# Patient Record
Sex: MALE | Race: BLACK OR AFRICAN AMERICAN | Employment: FULL TIME | URBAN - METROPOLITAN AREA
[De-identification: names, ages, dates, MRNs, and addresses within clinical notes are randomized per-mention and may not be internally consistent; named-entity substitution may affect disease eponyms.]

---

## 2022-01-19 ENCOUNTER — HOSPITAL ENCOUNTER (OUTPATIENT)
Dept: OCCUPATIONAL MEDICINE | Age: 62
Discharge: HOME | End: 2022-01-19
Attending: PREVENTIVE MEDICINE

## 2022-01-19 DIAGNOSIS — R76.11 POSITIVE PPD: ICD-10-CM

## 2022-07-15 ENCOUNTER — TRANSCRIBE ORDER (OUTPATIENT)
Dept: INTERVENTIONAL RADIOLOGY/VASCULAR | Age: 62
End: 2022-07-15

## 2022-07-15 DIAGNOSIS — M48.061 SPINAL STENOSIS AT L4-L5 LEVEL: Primary | ICD-10-CM

## 2022-07-15 NOTE — PROGRESS NOTES
Spoke with Mr. Zhen Jose to schedule for myelogram. Pt scheduled based on patient preference and appointment availability. Pt to arrive on Thursday 7/21/2022 at 1000 for 1100 appointment. NPO status not required. Pt encouraged to eat a light breakfast and drink baseline caffeine on the morning of the procedure. Pt instructed to have a  to and from the appointment. Anti-coagulant use: Aspirin, will hold morning of the exam. Plavix, will hold starting Sunday 7/17/22 for a total of 5 days. Patient was provided with the nursing office phone number and encouraged to call with any questions. All myelogram specific questions and procedural information was reviewed with Mr. Zhen Jose, who then requested this RN speak with his life partner Merlin Gardner as well. Mr. Zhen Jose gave full permission to speak with Ms. Frances as she is his primary care provider. All information was reviewed with Ms. Frances and all questions were answered. Ms. Caitlyn Mcguire raised concern that patient may need pain medication to lay flat in recovery for 4 hours post myelogram. She will follow up with Dr. Radha Chao to request prescription.   James Cochran, RN

## 2022-07-20 NOTE — PROGRESS NOTES
Spoke with  SA[facility] to verify 5 day hold of Plavix, patient reports he has not taken this medication since Sunday 7/17. Patient also instructed to not take his daily Aspirin in the morning.

## 2022-07-21 ENCOUNTER — HOSPITAL ENCOUNTER (OUTPATIENT)
Dept: GENERAL RADIOLOGY | Age: 62
Discharge: HOME | End: 2022-07-21
Attending: ORTHOPAEDIC SURGERY | Admitting: RADIOLOGY
Payer: MEDICARE

## 2022-07-21 ENCOUNTER — APPOINTMENT (OUTPATIENT)
Dept: CT IMAGING | Age: 62
End: 2022-07-21
Attending: ORTHOPAEDIC SURGERY
Payer: MEDICARE

## 2022-07-21 VITALS
WEIGHT: 236 LBS | TEMPERATURE: 97.9 F | DIASTOLIC BLOOD PRESSURE: 40 MMHG | HEIGHT: 66 IN | RESPIRATION RATE: 20 BRPM | HEART RATE: 72 BPM | SYSTOLIC BLOOD PRESSURE: 122 MMHG | BODY MASS INDEX: 37.93 KG/M2 | OXYGEN SATURATION: 99 %

## 2022-07-21 DIAGNOSIS — M48.061 SPINAL STENOSIS AT L4-L5 LEVEL: ICD-10-CM

## 2022-07-21 PROCEDURE — 72132 CT LUMBAR SPINE W/DYE: CPT

## 2022-07-21 PROCEDURE — 74011250637 HC RX REV CODE- 250/637: Performed by: PHYSICIAN ASSISTANT

## 2022-07-21 PROCEDURE — 62304 MYELOGRAPHY LUMBAR INJECTION: CPT

## 2022-07-21 PROCEDURE — 74011000636 HC RX REV CODE- 636: Performed by: ORTHOPAEDIC SURGERY

## 2022-07-21 RX ORDER — DIAZEPAM 5 MG/1
2.5 TABLET ORAL
COMMUNITY

## 2022-07-21 RX ORDER — LIDOCAINE HYDROCHLORIDE 10 MG/ML
1-10 INJECTION, SOLUTION EPIDURAL; INFILTRATION; INTRACAUDAL; PERINEURAL
Status: COMPLETED | OUTPATIENT
Start: 2022-07-21 | End: 2022-07-21

## 2022-07-21 RX ORDER — OXYCODONE AND ACETAMINOPHEN 5; 325 MG/1; MG/1
1-2 TABLET ORAL
Status: DISCONTINUED | OUTPATIENT
Start: 2022-07-21 | End: 2022-07-21 | Stop reason: HOSPADM

## 2022-07-21 RX ORDER — MIDODRINE HYDROCHLORIDE 10 MG/1
10 TABLET ORAL
COMMUNITY

## 2022-07-21 RX ORDER — ACETAMINOPHEN 325 MG/1
650 TABLET ORAL
Status: DISCONTINUED | OUTPATIENT
Start: 2022-07-21 | End: 2022-07-21 | Stop reason: HOSPADM

## 2022-07-21 RX ADMIN — IOPAMIDOL 14 ML: 408 INJECTION, SOLUTION INTRATHECAL at 12:05

## 2022-07-21 RX ADMIN — OXYCODONE AND ACETAMINOPHEN 2 TABLET: 5; 325 TABLET ORAL at 12:31

## 2022-07-21 RX ADMIN — LIDOCAINE HYDROCHLORIDE 4 ML: 10 INJECTION, SOLUTION EPIDURAL; INFILTRATION; INTRACAUDAL; PERINEURAL at 12:06

## 2022-07-21 NOTE — DISCHARGE INSTRUCTIONS
Myelogram: About This Test  What is it? A myelogram uses X-rays and a special dye to make pictures of bones and nerves of the spine (spinal canal). The spinal canal holds the spinal cord, the spinal nerve roots, and the fluid-filled space between the bones in your spine. Why is this test done? A myelogram is done to check for: The cause of arm or leg numbness, weakness, or pain. Narrowing of the spinal canal (spinal stenosis). A tumor or infection causing problems with the spinal cord or nerve roots. A spinal disc that has ruptured (herniated disc). Inflammation of the membrane that covers the brain and spinal cord. Problems with the blood vessels to the spine. This test may help find the cause of pain that can't be found by other tests, such as an MRI or a CT scan. How do you prepare for the test?  Your doctor will tell you if you need to change how much you eat and drink before the myelogram. You may be asked to increase the amount of water you drink before the test. Follow the instructions your doctor gives you about eating and drinking. If you take aspirin or some other blood thinner, ask your doctor if you should stop taking it before your test. Make sure that you understand exactly what your doctor wants you to do. These medicines increase the risk of bleeding. Be sure you have someone to take you home. Anesthesia and pain medicine will make it unsafe for you to drive or get home on your own. How is the test done? The dye is put into your spinal canal with a thin needle. This is called a lumbar puncture. The dye moves through the space so the nerve roots and spinal cord can be seen more clearly. After the dye is put in, you will lie still while the X-ray pictures are taken. How does it feel? You will feel a quick sting from a small needle that has medicine to numb the skin on your back.  You will also feel some pressure as the long, thin spinal needle is put into your spinal canal. You may feel a quick, sharp pain down your buttock or leg when the needle is moved in your spine. You may find it hard to lie on your stomach or side during this test.  The dye may make you feel warm and flushed and have a metallic taste in your mouth. Some people feel sick to their stomach or have a headache. Tell your doctor how you are feeling. How long does the test take? A myelogram usually takes 30 minutes to 1 hour. What happens after the test?  You will probably be able to go home 30 minutes to 2 hours after the test.  You may need to lie in bed with your head raised for 4 to 24 hours after the test. To prevent seizures, which are a rare side effect, do not bend over or lie down with your head lower than your body. Keeping your head higher than your body after a myelogram also may help prevent or reduce other side effects, such as headache, nausea, or vomiting. Avoid strenuous activity, such as running or heavy lifting, for at least 1 day after your myelogram.  Drink plenty of water after the myelogram. Your doctor will give you instructions on taking your regular medicines. When should you call for help? Call 911 anytime you think you may need emergency care. For example, call if:  You have a seizure. Call your doctor now or seek immediate medical care if:  You have any increase in pain, weakness, or numbness in your legs. You have a severe headache or stiff neck, or your eyes become very sensitive to light. You have a headache that lasts longer than 24 hours. You have problems urinating or having a bowel movement. You have a fever. Follow-up care is a key part of your treatment and safety. Be sure to make and go to all appointments, and call your doctor if you are having problems. It's also a good idea to keep a list of the medicines you take. Ask your doctor when you can expect to have your test results. Where can you learn more?   Go to http://www.gray.com/  Enter K481 in the search box to learn more about \"Myelogram: About This Test.\"  Current as of: June 17, 2021               Content Version: 13.2  © 2006-2022 [employer]. Care instructions adapted under license by [employer] (which disclaims liability or warranty for this information). If you have questions about a medical condition or this instruction, always ask your healthcare professional. [address] any warranty or liability for your use of this information. DISCHARGE SUMMARY from Nurse    PATIENT INSTRUCTIONS:    After general anesthesia or intravenous sedation, for 24 hours or while taking prescription Narcotics:  Limit your activities  Do not drive and operate hazardous machinery  Do not make important personal or business decisions  Do  not drink alcoholic beverages  If you have not urinated within 8 hours after discharge, please contact your surgeon on call. Report the following to your surgeon:  Excessive pain, swelling, redness or odor of or around the surgical area  Temperature over 100.5  Nausea and vomiting lasting longer than 4 hours or if unable to take medications  Any signs of decreased circulation or nerve impairment to extremity: change in color, persistent  numbness, tingling, coldness or increase pain  Any questions    What to do at Home:  Recommended activity: Activity as tolerated,     *  Please give a list of your current medications to your Primary Care Provider. *  Please update this list whenever your medications are discontinued, doses are      changed, or new medications (including over-the-counter products) are added. *  Please carry medication information at all times in case of emergency situations.     These are general instructions for a healthy lifestyle:    No smoking/ No tobacco products/ Avoid exposure to second hand smoke  Surgeon General's Warning:  Quitting smoking now greatly reduces serious risk to your health. Obesity, smoking, and sedentary lifestyle greatly increases your risk for illness    A healthy diet, regular physical exercise & weight monitoring are important for maintaining a healthy lifestyle    You may be retaining fluid if you have a history of heart failure or if you experience any of the following symptoms:  Weight gain of 3 pounds or more overnight or 5 pounds in a week, increased swelling in our hands or feet or shortness of breath while lying flat in bed. Please call your doctor as soon as you notice any of these symptoms; do not wait until your next office visit. The discharge information has been reviewed with the patient and spouse. The patient and spouse verbalized understanding. Discharge medications reviewed with the patient and spouse and appropriate educational materials and side effects teaching were provided.   ___________________________________________________________________________________________________________________________________

## 2022-07-21 NOTE — PROCEDURES
Interventional Radiology Brief Procedure Note    Performed By: Nicolle Richey PA-C    Attending Radiologist: Elan Ruvalcaba MD    Pre-operative Diagnosis:  Low back pain & bilateral leg radiculopathy x 2 years    Post-operative Diagnosis: Same as pre-op diagnosis    Procedure(s) Performed:  Lumbar Myelogram    Anesthesia:  Local Anesthesia    Findings:  The patient's low back was prepped in the usual sterile manner. 1% Lidocaine was used for local anesthesia. A 20 G x 6 inch spinal needle was advanced into the spinal subarachnoid space via a sublaminar oblique approach at L3. There was immediate return of clear CSF. 15 cc of Isovue 200M was instilled into dural sac. Images and subsequent CT scan to follow. Complications: None immediate    Estimated Blood Loss:  Minimal    Tubes and Drains: None    Specimens: None    Condition: Good    Disposition:  Observation x 4 hours, then D/C home.     Nicolle Richey PA-C  [facility]  Vascular & Interventional Radiology  7/21/2022

## 2022-07-21 NOTE — PROGRESS NOTES
Pt is all prepped and ready for procedure    1130 Pt picked up for procedure    1230 Pt back to care unit. Procedure tolerated with pain ongoing as per patient. Band-aid to lower back dry and intact. C/o lower back pain 10/10, two percocet adm ASAP. Pt took diazepam 12.5 mg own med for anxiety    1300 Pt resting with eyes closed and voiced no further complaints. 1400 Discharge instructions reviewed with pt and spouse and they verbalized all understandings.      [phone] Pt escorted to car via w/c , left with spouse in stable condition

## 2022-08-08 ENCOUNTER — TRANSCRIBE ORDER (OUTPATIENT)
Dept: SCHEDULING | Age: 62
End: 2022-08-08

## 2022-08-08 DIAGNOSIS — C90.00 MYELOMA (HCC): Primary | ICD-10-CM

## 2023-03-06 ENCOUNTER — TRANSCRIBE ORDERS (OUTPATIENT)
Facility: HOSPITAL | Age: 63
End: 2023-03-06

## 2023-03-06 DIAGNOSIS — M43.16 SPONDYLOLISTHESIS OF LUMBAR REGION: Primary | ICD-10-CM

## 2023-04-07 ENCOUNTER — HOSPITAL ENCOUNTER (OUTPATIENT)
Facility: HOSPITAL | Age: 63
Discharge: HOME | End: 2023-04-07
Attending: ORTHOPAEDIC SURGERY | Admitting: ORTHOPAEDIC SURGERY
Payer: MEDICARE

## 2023-04-07 ENCOUNTER — APPOINTMENT (OUTPATIENT)
Facility: HOSPITAL | Age: 63
End: 2023-04-07
Payer: MEDICARE

## 2023-04-07 ENCOUNTER — APPOINTMENT (OUTPATIENT)
Facility: HOSPITAL | Age: 63
End: 2023-04-07
Attending: ORTHOPAEDIC SURGERY
Payer: MEDICARE

## 2023-04-07 VITALS
HEART RATE: 55 BPM | HEIGHT: 65 IN | WEIGHT: 179.8 LBS | TEMPERATURE: 98 F | DIASTOLIC BLOOD PRESSURE: 66 MMHG | RESPIRATION RATE: 16 BRPM | BODY MASS INDEX: 29.96 KG/M2 | SYSTOLIC BLOOD PRESSURE: 152 MMHG | OXYGEN SATURATION: 100 %

## 2023-04-07 DIAGNOSIS — M47.816 LUMBAR SPONDYLOSIS: ICD-10-CM

## 2023-04-07 DIAGNOSIS — M43.16 SPONDYLOLISTHESIS, LUMBAR REGION: ICD-10-CM

## 2023-04-07 DIAGNOSIS — M43.16 SPONDYLOLISTHESIS OF LUMBAR REGION: ICD-10-CM

## 2023-04-07 LAB
EKG ATRIAL RATE: 62 BPM
EKG DIAGNOSIS: NORMAL
EKG P AXIS: 88 DEGREES
EKG P-R INTERVAL: 178 MS
EKG Q-T INTERVAL: 486 MS
EKG QRS DURATION: 184 MS
EKG QTC CALCULATION (BAZETT): 493 MS
EKG R AXIS: 270 DEGREES
EKG T AXIS: 82 DEGREES
EKG VENTRICULAR RATE: 62 BPM

## 2023-04-07 PROCEDURE — 2500000003 HC RX 250 WO HCPCS: Performed by: ORTHOPAEDIC SURGERY

## 2023-04-07 PROCEDURE — 93005 ELECTROCARDIOGRAM TRACING: CPT

## 2023-04-07 PROCEDURE — 62304 MYELOGRAPHY LUMBAR INJECTION: CPT

## 2023-04-07 PROCEDURE — 72132 CT LUMBAR SPINE W/DYE: CPT

## 2023-04-07 PROCEDURE — 6360000004 HC RX CONTRAST MEDICATION: Performed by: ORTHOPAEDIC SURGERY

## 2023-04-07 RX ORDER — LIDOCAINE HYDROCHLORIDE 10 MG/ML
23 INJECTION, SOLUTION EPIDURAL; INFILTRATION; INTRACAUDAL; PERINEURAL ONCE
Status: COMPLETED | OUTPATIENT
Start: 2023-04-07 | End: 2023-04-07

## 2023-04-07 RX ORDER — LIDOCAINE HYDROCHLORIDE 10 MG/ML
20 INJECTION, SOLUTION EPIDURAL; INFILTRATION; INTRACAUDAL; PERINEURAL ONCE
Status: DISCONTINUED | OUTPATIENT
Start: 2023-04-07 | End: 2023-04-07

## 2023-04-07 RX ADMIN — IOPAMIDOL 12 ML: 408 INJECTION, SOLUTION INTRATHECAL at 13:02

## 2023-04-07 RX ADMIN — LIDOCAINE HYDROCHLORIDE 23 ML: 10 INJECTION, SOLUTION EPIDURAL; INFILTRATION; INTRACAUDAL; PERINEURAL at 13:07

## 2023-04-07 ASSESSMENT — PAIN - FUNCTIONAL ASSESSMENT
PAIN_FUNCTIONAL_ASSESSMENT: PREVENTS OR INTERFERES SOME ACTIVE ACTIVITIES AND ADLS
PAIN_FUNCTIONAL_ASSESSMENT: NONE - DENIES PAIN

## 2023-04-07 NOTE — PROGRESS NOTES
1140  pt to ultrasound via stretcher  1300  pt returned from ultrasound,  pt alert and oriented,  band aid intact to mid lower back,  full purposeful rom in both lower extremities but increases back pain per pt  Lunch given  Resting and watching tv  1415 pt dressed self .  Discharge inst given and reivewed with pt,  pt verbally inderstanding,  arm bands removed and shredded,  pt discharged via wheelchair to car in care of friend,  pt states pain 7/10 at time of discharge

## 2023-04-10 PROBLEM — M51.26 HNP (HERNIATED NUCLEUS PULPOSUS), LUMBAR: Status: ACTIVE | Noted: 2023-04-10

## 2023-04-10 PROBLEM — M51.36 DDD (DEGENERATIVE DISC DISEASE), LUMBAR: Status: ACTIVE | Noted: 2023-04-10

## 2023-04-10 PROBLEM — M48.062 SPINAL STENOSIS, LUMBAR REGION WITH NEUROGENIC CLAUDICATION: Status: ACTIVE | Noted: 2023-04-10

## 2023-04-10 PROBLEM — M51.369 DDD (DEGENERATIVE DISC DISEASE), LUMBAR: Status: ACTIVE | Noted: 2023-04-10

## 2023-04-14 PROBLEM — Z95.0 CARDIAC PACEMAKER: Status: ACTIVE | Noted: 2023-04-14

## 2023-04-14 PROBLEM — Z22.322 MRSA NASAL COLONIZATION: Status: ACTIVE | Noted: 2023-04-14

## 2023-04-14 PROBLEM — Z94.0 RENAL TRANSPLANT, STATUS POST: Status: ACTIVE | Noted: 2023-04-14

## 2023-04-14 PROBLEM — M43.16 SPONDYLOLISTHESIS AT L4-L5 LEVEL: Status: ACTIVE | Noted: 2023-04-14

## 2023-04-14 PROBLEM — C90.00 MULTIPLE MYELOMA (HCC): Status: ACTIVE | Noted: 2023-04-14

## 2023-06-19 ENCOUNTER — ANESTHESIA EVENT (OUTPATIENT)
Facility: HOSPITAL | Age: 63
End: 2023-06-19
Payer: MEDICARE

## 2023-06-20 ENCOUNTER — HOSPITAL ENCOUNTER (OUTPATIENT)
Facility: HOSPITAL | Age: 63
Setting detail: OBSERVATION
Discharge: NURSING, REHAB OR LONG-TERM CARE | End: 2023-06-22
Attending: ORTHOPAEDIC SURGERY | Admitting: ORTHOPAEDIC SURGERY
Payer: MEDICARE

## 2023-06-20 ENCOUNTER — ANESTHESIA (OUTPATIENT)
Facility: HOSPITAL | Age: 63
End: 2023-06-20
Payer: MEDICARE

## 2023-06-20 ENCOUNTER — APPOINTMENT (OUTPATIENT)
Facility: HOSPITAL | Age: 63
End: 2023-06-20
Attending: ORTHOPAEDIC SURGERY
Payer: MEDICARE

## 2023-06-20 DIAGNOSIS — M43.16 SPONDYLOLISTHESIS AT L4-L5 LEVEL: Primary | ICD-10-CM

## 2023-06-20 LAB
ABO + RH BLD: NORMAL
ANION GAP SERPL CALC-SCNC: 4 MMOL/L (ref 3–18)
BLOOD GROUP ANTIBODIES SERPL: NORMAL
BUN SERPL-MCNC: 36 MG/DL (ref 7–18)
BUN/CREAT SERPL: 24 (ref 12–20)
CALCIUM SERPL-MCNC: 8.9 MG/DL (ref 8.5–10.1)
CHLORIDE SERPL-SCNC: 118 MMOL/L (ref 100–111)
CO2 SERPL-SCNC: 22 MMOL/L (ref 21–32)
CREAT SERPL-MCNC: 1.53 MG/DL (ref 0.6–1.3)
GLUCOSE BLD STRIP.AUTO-MCNC: 124 MG/DL (ref 70–110)
GLUCOSE BLD STRIP.AUTO-MCNC: 190 MG/DL (ref 70–110)
GLUCOSE BLD STRIP.AUTO-MCNC: 199 MG/DL (ref 70–110)
GLUCOSE SERPL-MCNC: 127 MG/DL (ref 74–99)
HCT VFR BLD AUTO: 22.6 % (ref 36–48)
HCT VFR BLD AUTO: 24.9 % (ref 36–48)
HGB BLD-MCNC: 6.8 G/DL (ref 13–16)
HGB BLD-MCNC: 7.7 G/DL (ref 13–16)
POTASSIUM SERPL-SCNC: 4.8 MMOL/L (ref 3.5–5.5)
SODIUM SERPL-SCNC: 144 MMOL/L (ref 136–145)
SPECIMEN EXP DATE BLD: NORMAL

## 2023-06-20 PROCEDURE — 2709999900 HC NON-CHARGEABLE SUPPLY: Performed by: ORTHOPAEDIC SURGERY

## 2023-06-20 PROCEDURE — 2720000010 HC SURG SUPPLY STERILE: Performed by: ORTHOPAEDIC SURGERY

## 2023-06-20 PROCEDURE — 6370000000 HC RX 637 (ALT 250 FOR IP): Performed by: ORTHOPAEDIC SURGERY

## 2023-06-20 PROCEDURE — 86901 BLOOD TYPING SEROLOGIC RH(D): CPT

## 2023-06-20 PROCEDURE — 7100000001 HC PACU RECOVERY - ADDTL 15 MIN: Performed by: ORTHOPAEDIC SURGERY

## 2023-06-20 PROCEDURE — 85014 HEMATOCRIT: CPT

## 2023-06-20 PROCEDURE — 6360000002 HC RX W HCPCS: Performed by: ORTHOPAEDIC SURGERY

## 2023-06-20 PROCEDURE — 36415 COLL VENOUS BLD VENIPUNCTURE: CPT

## 2023-06-20 PROCEDURE — 97530 THERAPEUTIC ACTIVITIES: CPT

## 2023-06-20 PROCEDURE — 82962 GLUCOSE BLOOD TEST: CPT

## 2023-06-20 PROCEDURE — 2580000003 HC RX 258: Performed by: ORTHOPAEDIC SURGERY

## 2023-06-20 PROCEDURE — 6370000000 HC RX 637 (ALT 250 FOR IP): Performed by: PHYSICIAN ASSISTANT

## 2023-06-20 PROCEDURE — 2500000003 HC RX 250 WO HCPCS: Performed by: PHYSICIAN ASSISTANT

## 2023-06-20 PROCEDURE — 77002 NEEDLE LOCALIZATION BY XRAY: CPT

## 2023-06-20 PROCEDURE — 6360000002 HC RX W HCPCS: Performed by: PHYSICIAN ASSISTANT

## 2023-06-20 PROCEDURE — 97165 OT EVAL LOW COMPLEX 30 MIN: CPT

## 2023-06-20 PROCEDURE — 2500000003 HC RX 250 WO HCPCS: Performed by: ANESTHESIOLOGY

## 2023-06-20 PROCEDURE — A4217 STERILE WATER/SALINE, 500 ML: HCPCS | Performed by: ORTHOPAEDIC SURGERY

## 2023-06-20 PROCEDURE — 3600000002 HC SURGERY LEVEL 2 BASE: Performed by: ORTHOPAEDIC SURGERY

## 2023-06-20 PROCEDURE — 2500000003 HC RX 250 WO HCPCS: Performed by: NURSE ANESTHETIST, CERTIFIED REGISTERED

## 2023-06-20 PROCEDURE — C1713 ANCHOR/SCREW BN/BN,TIS/BN: HCPCS | Performed by: ORTHOPAEDIC SURGERY

## 2023-06-20 PROCEDURE — 97162 PT EVAL MOD COMPLEX 30 MIN: CPT

## 2023-06-20 PROCEDURE — 86850 RBC ANTIBODY SCREEN: CPT

## 2023-06-20 PROCEDURE — 6360000002 HC RX W HCPCS: Performed by: NURSE ANESTHETIST, CERTIFIED REGISTERED

## 2023-06-20 PROCEDURE — 3600000012 HC SURGERY LEVEL 2 ADDTL 15MIN: Performed by: ORTHOPAEDIC SURGERY

## 2023-06-20 PROCEDURE — 2580000003 HC RX 258: Performed by: PHYSICIAN ASSISTANT

## 2023-06-20 PROCEDURE — 85018 HEMOGLOBIN: CPT

## 2023-06-20 PROCEDURE — 86900 BLOOD TYPING SEROLOGIC ABO: CPT

## 2023-06-20 PROCEDURE — 7100000000 HC PACU RECOVERY - FIRST 15 MIN: Performed by: ORTHOPAEDIC SURGERY

## 2023-06-20 PROCEDURE — 2700000000 HC OXYGEN THERAPY PER DAY

## 2023-06-20 PROCEDURE — 3700000001 HC ADD 15 MINUTES (ANESTHESIA): Performed by: ORTHOPAEDIC SURGERY

## 2023-06-20 PROCEDURE — 3700000000 HC ANESTHESIA ATTENDED CARE: Performed by: ORTHOPAEDIC SURGERY

## 2023-06-20 PROCEDURE — 80048 BASIC METABOLIC PNL TOTAL CA: CPT

## 2023-06-20 DEVICE — GRAFT BNE LG: Type: IMPLANTABLE DEVICE | Site: SPINE LUMBAR | Status: FUNCTIONAL

## 2023-06-20 DEVICE — ROD SPNL L100MM DIA55MM TI PRELORDOSED MEDIALIZED POST: Type: IMPLANTABLE DEVICE | Site: SPINE LUMBAR | Status: FUNCTIONAL

## 2023-06-20 DEVICE — SCREW SPNL L40MM DIA7MM TI SGL INNR POLYAX FOR 5.5MM ROD: Type: IMPLANTABLE DEVICE | Site: SPINE LUMBAR | Status: FUNCTIONAL

## 2023-06-20 DEVICE — SET SCR SPNL L25MM DIA5.5MM TI FOR 5.5MM ROD EXPEDIUM: Type: IMPLANTABLE DEVICE | Site: SPINE LUMBAR | Status: FUNCTIONAL

## 2023-06-20 DEVICE — 11 CC SPINDLE DRIVE SYRINGE OF BG PUTTY BIOACTIVE BONE GRAFT SUBSTITUTE.
Type: IMPLANTABLE DEVICE | Site: SPINE LUMBAR | Status: FUNCTIONAL
Brand: FIBERGRAFT BG PUTTY-GPS

## 2023-06-20 RX ORDER — HYDROMORPHONE HYDROCHLORIDE 2 MG/1
2 TABLET ORAL EVERY 4 HOURS PRN
Status: DISCONTINUED | OUTPATIENT
Start: 2023-06-20 | End: 2023-06-22 | Stop reason: HOSPADM

## 2023-06-20 RX ORDER — SODIUM CHLORIDE 0.9 % (FLUSH) 0.9 %
5-40 SYRINGE (ML) INJECTION PRN
Status: DISCONTINUED | OUTPATIENT
Start: 2023-06-20 | End: 2023-06-22 | Stop reason: SDUPTHER

## 2023-06-20 RX ORDER — SODIUM CHLORIDE 0.9 % (FLUSH) 0.9 %
5-40 SYRINGE (ML) INJECTION EVERY 12 HOURS SCHEDULED
Status: DISCONTINUED | OUTPATIENT
Start: 2023-06-20 | End: 2023-06-22 | Stop reason: SDUPTHER

## 2023-06-20 RX ORDER — TRANEXAMIC ACID 10 MG/ML
1000 INJECTION, SOLUTION INTRAVENOUS ONCE
Status: COMPLETED | OUTPATIENT
Start: 2023-06-20 | End: 2023-06-20

## 2023-06-20 RX ORDER — CYCLOBENZAPRINE HCL 10 MG
10 TABLET ORAL 3 TIMES DAILY PRN
Status: DISCONTINUED | OUTPATIENT
Start: 2023-06-20 | End: 2023-06-22 | Stop reason: HOSPADM

## 2023-06-20 RX ORDER — DEXAMETHASONE SODIUM PHOSPHATE 4 MG/ML
INJECTION, SOLUTION INTRA-ARTICULAR; INTRALESIONAL; INTRAMUSCULAR; INTRAVENOUS; SOFT TISSUE PRN
Status: DISCONTINUED | OUTPATIENT
Start: 2023-06-20 | End: 2023-06-20 | Stop reason: SDUPTHER

## 2023-06-20 RX ORDER — OXYCODONE HCL 10 MG/1
10 TABLET, FILM COATED, EXTENDED RELEASE ORAL EVERY 12 HOURS SCHEDULED
Status: DISCONTINUED | OUTPATIENT
Start: 2023-06-20 | End: 2023-06-22 | Stop reason: HOSPADM

## 2023-06-20 RX ORDER — BISACODYL 5 MG/1
5 TABLET, DELAYED RELEASE ORAL DAILY
Status: DISCONTINUED | OUTPATIENT
Start: 2023-06-20 | End: 2023-06-22 | Stop reason: HOSPADM

## 2023-06-20 RX ORDER — DIPHENHYDRAMINE HYDROCHLORIDE 50 MG/ML
12.5 INJECTION INTRAMUSCULAR; INTRAVENOUS EVERY 6 HOURS PRN
Status: DISCONTINUED | OUTPATIENT
Start: 2023-06-20 | End: 2023-06-22 | Stop reason: HOSPADM

## 2023-06-20 RX ORDER — PROPOFOL 10 MG/ML
INJECTION, EMULSION INTRAVENOUS PRN
Status: DISCONTINUED | OUTPATIENT
Start: 2023-06-20 | End: 2023-06-20 | Stop reason: SDUPTHER

## 2023-06-20 RX ORDER — EPHEDRINE SULFATE/0.9% NACL/PF 50 MG/5 ML
SYRINGE (ML) INTRAVENOUS PRN
Status: DISCONTINUED | OUTPATIENT
Start: 2023-06-20 | End: 2023-06-20 | Stop reason: SDUPTHER

## 2023-06-20 RX ORDER — OXYCODONE HYDROCHLORIDE 5 MG/1
5 TABLET ORAL PRN
Status: DISCONTINUED | OUTPATIENT
Start: 2023-06-20 | End: 2023-06-20 | Stop reason: HOSPADM

## 2023-06-20 RX ORDER — INSULIN LISPRO 100 [IU]/ML
0-10 INJECTION, SOLUTION INTRAVENOUS; SUBCUTANEOUS
Status: DISCONTINUED | OUTPATIENT
Start: 2023-06-21 | End: 2023-06-22 | Stop reason: HOSPADM

## 2023-06-20 RX ORDER — DEXTROSE MONOHYDRATE 100 MG/ML
INJECTION, SOLUTION INTRAVENOUS CONTINUOUS PRN
Status: DISCONTINUED | OUTPATIENT
Start: 2023-06-20 | End: 2023-06-22 | Stop reason: HOSPADM

## 2023-06-20 RX ORDER — HYDROMORPHONE HYDROCHLORIDE 1 MG/ML
0.5 INJECTION, SOLUTION INTRAMUSCULAR; INTRAVENOUS; SUBCUTANEOUS EVERY 5 MIN PRN
Status: DISCONTINUED | OUTPATIENT
Start: 2023-06-20 | End: 2023-06-22

## 2023-06-20 RX ORDER — HYDROMORPHONE HYDROCHLORIDE 2 MG/1
1 TABLET ORAL EVERY 4 HOURS PRN
Status: DISCONTINUED | OUTPATIENT
Start: 2023-06-20 | End: 2023-06-22 | Stop reason: HOSPADM

## 2023-06-20 RX ORDER — DIPHENHYDRAMINE HCL 25 MG
25 CAPSULE ORAL EVERY 6 HOURS PRN
Status: DISCONTINUED | OUTPATIENT
Start: 2023-06-20 | End: 2023-06-22 | Stop reason: HOSPADM

## 2023-06-20 RX ORDER — ONDANSETRON 2 MG/ML
INJECTION INTRAMUSCULAR; INTRAVENOUS PRN
Status: DISCONTINUED | OUTPATIENT
Start: 2023-06-20 | End: 2023-06-20 | Stop reason: SDUPTHER

## 2023-06-20 RX ORDER — AMLODIPINE BESYLATE 5 MG/1
5 TABLET ORAL DAILY
Status: DISCONTINUED | OUTPATIENT
Start: 2023-06-21 | End: 2023-06-22 | Stop reason: HOSPADM

## 2023-06-20 RX ORDER — TAMSULOSIN HYDROCHLORIDE 0.4 MG/1
0.8 CAPSULE ORAL NIGHTLY
Status: DISCONTINUED | OUTPATIENT
Start: 2023-06-20 | End: 2023-06-22 | Stop reason: HOSPADM

## 2023-06-20 RX ORDER — HYDROMORPHONE HYDROCHLORIDE 1 MG/ML
0.5 INJECTION, SOLUTION INTRAMUSCULAR; INTRAVENOUS; SUBCUTANEOUS EVERY 5 MIN PRN
Status: DISCONTINUED | OUTPATIENT
Start: 2023-06-20 | End: 2023-06-20 | Stop reason: HOSPADM

## 2023-06-20 RX ORDER — ONDANSETRON 2 MG/ML
4 INJECTION INTRAMUSCULAR; INTRAVENOUS EVERY 6 HOURS PRN
Status: DISCONTINUED | OUTPATIENT
Start: 2023-06-20 | End: 2023-06-22 | Stop reason: HOSPADM

## 2023-06-20 RX ORDER — FENTANYL CITRATE 50 UG/ML
INJECTION, SOLUTION INTRAMUSCULAR; INTRAVENOUS PRN
Status: DISCONTINUED | OUTPATIENT
Start: 2023-06-20 | End: 2023-06-20 | Stop reason: SDUPTHER

## 2023-06-20 RX ORDER — NALOXONE HYDROCHLORIDE 0.4 MG/ML
INJECTION, SOLUTION INTRAMUSCULAR; INTRAVENOUS; SUBCUTANEOUS PRN
Status: DISCONTINUED | OUTPATIENT
Start: 2023-06-20 | End: 2023-06-21

## 2023-06-20 RX ORDER — CARVEDILOL 3.12 MG/1
6.25 TABLET ORAL 2 TIMES DAILY
Status: DISCONTINUED | OUTPATIENT
Start: 2023-06-20 | End: 2023-06-22 | Stop reason: HOSPADM

## 2023-06-20 RX ORDER — MIDAZOLAM HYDROCHLORIDE 1 MG/ML
INJECTION INTRAMUSCULAR; INTRAVENOUS PRN
Status: DISCONTINUED | OUTPATIENT
Start: 2023-06-20 | End: 2023-06-20 | Stop reason: SDUPTHER

## 2023-06-20 RX ORDER — ONDANSETRON 2 MG/ML
4 INJECTION INTRAMUSCULAR; INTRAVENOUS
Status: DISCONTINUED | OUTPATIENT
Start: 2023-06-20 | End: 2023-06-20 | Stop reason: HOSPADM

## 2023-06-20 RX ORDER — MYCOPHENOLATE MOFETIL 250 MG/1
1000 CAPSULE ORAL 2 TIMES DAILY
Status: DISCONTINUED | OUTPATIENT
Start: 2023-06-20 | End: 2023-06-22 | Stop reason: HOSPADM

## 2023-06-20 RX ORDER — SODIUM CHLORIDE 9 MG/ML
INJECTION, SOLUTION INTRAVENOUS PRN
Status: DISCONTINUED | OUTPATIENT
Start: 2023-06-20 | End: 2023-06-22 | Stop reason: HOSPADM

## 2023-06-20 RX ORDER — ACETAMINOPHEN 325 MG/1
650 TABLET ORAL EVERY 4 HOURS PRN
Status: DISCONTINUED | OUTPATIENT
Start: 2023-06-20 | End: 2023-06-22 | Stop reason: HOSPADM

## 2023-06-20 RX ORDER — SODIUM CHLORIDE 0.9 % (FLUSH) 0.9 %
5-40 SYRINGE (ML) INJECTION PRN
Status: DISCONTINUED | OUTPATIENT
Start: 2023-06-20 | End: 2023-06-22 | Stop reason: HOSPADM

## 2023-06-20 RX ORDER — FENTANYL CITRATE 50 UG/ML
50 INJECTION, SOLUTION INTRAMUSCULAR; INTRAVENOUS EVERY 5 MIN PRN
Status: DISCONTINUED | OUTPATIENT
Start: 2023-06-20 | End: 2023-06-20 | Stop reason: HOSPADM

## 2023-06-20 RX ORDER — VITAMIN E 268 MG
1000 CAPSULE ORAL DAILY
Status: DISCONTINUED | OUTPATIENT
Start: 2023-06-20 | End: 2023-06-22 | Stop reason: HOSPADM

## 2023-06-20 RX ORDER — MAGNESIUM HYDROXIDE/ALUMINUM HYDROXICE/SIMETHICONE 120; 1200; 1200 MG/30ML; MG/30ML; MG/30ML
15 SUSPENSION ORAL EVERY 6 HOURS PRN
Status: DISCONTINUED | OUTPATIENT
Start: 2023-06-20 | End: 2023-06-22 | Stop reason: HOSPADM

## 2023-06-20 RX ORDER — SODIUM CHLORIDE, SODIUM LACTATE, POTASSIUM CHLORIDE, CALCIUM CHLORIDE 600; 310; 30; 20 MG/100ML; MG/100ML; MG/100ML; MG/100ML
INJECTION, SOLUTION INTRAVENOUS CONTINUOUS
Status: DISCONTINUED | OUTPATIENT
Start: 2023-06-20 | End: 2023-06-22

## 2023-06-20 RX ORDER — SODIUM CHLORIDE 0.9 % (FLUSH) 0.9 %
5-40 SYRINGE (ML) INJECTION EVERY 12 HOURS SCHEDULED
Status: DISCONTINUED | OUTPATIENT
Start: 2023-06-20 | End: 2023-06-22 | Stop reason: HOSPADM

## 2023-06-20 RX ORDER — INSULIN LISPRO 100 [IU]/ML
2 INJECTION, SOLUTION INTRAVENOUS; SUBCUTANEOUS
Status: DISCONTINUED | OUTPATIENT
Start: 2023-06-20 | End: 2023-06-20

## 2023-06-20 RX ORDER — ONDANSETRON 4 MG/1
4 TABLET, ORALLY DISINTEGRATING ORAL EVERY 8 HOURS PRN
Status: DISCONTINUED | OUTPATIENT
Start: 2023-06-20 | End: 2023-06-22 | Stop reason: HOSPADM

## 2023-06-20 RX ORDER — LOSARTAN POTASSIUM 50 MG/1
50 TABLET ORAL DAILY
Status: DISCONTINUED | OUTPATIENT
Start: 2023-06-20 | End: 2023-06-22 | Stop reason: HOSPADM

## 2023-06-20 RX ORDER — LIDOCAINE HYDROCHLORIDE 20 MG/ML
INJECTION, SOLUTION EPIDURAL; INFILTRATION; INTRACAUDAL; PERINEURAL PRN
Status: DISCONTINUED | OUTPATIENT
Start: 2023-06-20 | End: 2023-06-20 | Stop reason: SDUPTHER

## 2023-06-20 RX ORDER — POLYETHYLENE GLYCOL 3350 17 G/17G
17 POWDER, FOR SOLUTION ORAL DAILY
Status: DISCONTINUED | OUTPATIENT
Start: 2023-06-20 | End: 2023-06-22 | Stop reason: HOSPADM

## 2023-06-20 RX ORDER — GLYCOPYRROLATE 0.2 MG/ML
INJECTION INTRAMUSCULAR; INTRAVENOUS PRN
Status: DISCONTINUED | OUTPATIENT
Start: 2023-06-20 | End: 2023-06-20 | Stop reason: SDUPTHER

## 2023-06-20 RX ORDER — ROSUVASTATIN CALCIUM 10 MG/1
10 TABLET, COATED ORAL NIGHTLY
Status: DISCONTINUED | OUTPATIENT
Start: 2023-06-20 | End: 2023-06-22 | Stop reason: HOSPADM

## 2023-06-20 RX ORDER — OXYCODONE HYDROCHLORIDE 5 MG/1
10 TABLET ORAL PRN
Status: DISCONTINUED | OUTPATIENT
Start: 2023-06-20 | End: 2023-06-20 | Stop reason: HOSPADM

## 2023-06-20 RX ORDER — ROCURONIUM BROMIDE 10 MG/ML
INJECTION, SOLUTION INTRAVENOUS PRN
Status: DISCONTINUED | OUTPATIENT
Start: 2023-06-20 | End: 2023-06-20 | Stop reason: SDUPTHER

## 2023-06-20 RX ADMIN — ROSUVASTATIN CALCIUM 10 MG: 10 TABLET, COATED ORAL at 21:56

## 2023-06-20 RX ADMIN — VANCOMYCIN HYDROCHLORIDE 1250 MG: 10 INJECTION, POWDER, LYOPHILIZED, FOR SOLUTION INTRAVENOUS at 23:48

## 2023-06-20 RX ADMIN — GLYCOPYRROLATE 0.2 MG: 0.2 INJECTION, SOLUTION INTRAMUSCULAR; INTRAVENOUS at 12:57

## 2023-06-20 RX ADMIN — DEXAMETHASONE SODIUM PHOSPHATE 4 MG: 4 INJECTION, SOLUTION INTRAMUSCULAR; INTRAVENOUS at 13:01

## 2023-06-20 RX ADMIN — SODIUM CHLORIDE, SODIUM LACTATE, POTASSIUM CHLORIDE, AND CALCIUM CHLORIDE: 600; 310; 30; 20 INJECTION, SOLUTION INTRAVENOUS at 12:57

## 2023-06-20 RX ADMIN — FENTANYL CITRATE 100 MCG: 50 INJECTION, SOLUTION INTRAMUSCULAR; INTRAVENOUS at 12:59

## 2023-06-20 RX ADMIN — LIDOCAINE HYDROCHLORIDE 100 MG: 20 INJECTION, SOLUTION EPIDURAL; INFILTRATION; INTRACAUDAL; PERINEURAL at 13:01

## 2023-06-20 RX ADMIN — MYCOPHENOLATE MOFETIL 1000 MG: 250 CAPSULE ORAL at 21:55

## 2023-06-20 RX ADMIN — Medication: at 16:17

## 2023-06-20 RX ADMIN — SUGAMMADEX 200 MG: 100 INJECTION, SOLUTION INTRAVENOUS at 15:19

## 2023-06-20 RX ADMIN — HYDROMORPHONE HYDROCHLORIDE 0.5 MG: 1 INJECTION, SOLUTION INTRAMUSCULAR; INTRAVENOUS; SUBCUTANEOUS at 12:05

## 2023-06-20 RX ADMIN — Medication 1250 MG: at 12:57

## 2023-06-20 RX ADMIN — SODIUM CHLORIDE, SODIUM LACTATE, POTASSIUM CHLORIDE, AND CALCIUM CHLORIDE: 600; 310; 30; 20 INJECTION, SOLUTION INTRAVENOUS at 10:58

## 2023-06-20 RX ADMIN — POLYETHYLENE GLYCOL 3350 17 G: 17 POWDER, FOR SOLUTION ORAL at 18:29

## 2023-06-20 RX ADMIN — ROCURONIUM BROMIDE 30 MG: 10 INJECTION, SOLUTION INTRAVENOUS at 13:51

## 2023-06-20 RX ADMIN — Medication 1200 UNITS: at 21:54

## 2023-06-20 RX ADMIN — ROCURONIUM BROMIDE 50 MG: 10 INJECTION, SOLUTION INTRAVENOUS at 13:01

## 2023-06-20 RX ADMIN — Medication 10 MG: at 13:15

## 2023-06-20 RX ADMIN — INSULIN LISPRO 2 UNITS: 100 INJECTION, SOLUTION INTRAVENOUS; SUBCUTANEOUS at 19:11

## 2023-06-20 RX ADMIN — TAMSULOSIN HYDROCHLORIDE 0.8 MG: 0.4 CAPSULE ORAL at 21:56

## 2023-06-20 RX ADMIN — ONDANSETRON HYDROCHLORIDE 4 MG: 2 INJECTION INTRAMUSCULAR; INTRAVENOUS at 14:56

## 2023-06-20 RX ADMIN — Medication 10 MG: at 13:20

## 2023-06-20 RX ADMIN — PROPOFOL 120 MG: 10 INJECTION, EMULSION INTRAVENOUS at 13:01

## 2023-06-20 RX ADMIN — Medication 1250 MG: at 12:07

## 2023-06-20 RX ADMIN — BISACODYL 5 MG: 5 TABLET, COATED ORAL at 18:29

## 2023-06-20 RX ADMIN — TRANEXAMIC ACID 1000 MG: 10 INJECTION, SOLUTION INTRAVENOUS at 13:01

## 2023-06-20 RX ADMIN — ACETAMINOPHEN 650 MG: 325 TABLET ORAL at 18:28

## 2023-06-20 RX ADMIN — MIDAZOLAM 2 MG: 1 INJECTION INTRAMUSCULAR; INTRAVENOUS at 12:57

## 2023-06-20 ASSESSMENT — PAIN DESCRIPTION - DESCRIPTORS
DESCRIPTORS: SHARP

## 2023-06-20 ASSESSMENT — PAIN SCALES - GENERAL
PAINLEVEL_OUTOF10: 0
PAINLEVEL_OUTOF10: 0
PAINLEVEL_OUTOF10: 9
PAINLEVEL_OUTOF10: 0
PAINLEVEL_OUTOF10: 10
PAINLEVEL_OUTOF10: 0
PAINLEVEL_OUTOF10: 0
PAINLEVEL_OUTOF10: 10
PAINLEVEL_OUTOF10: 10
PAINLEVEL_OUTOF10: 0
PAINLEVEL_OUTOF10: 10
PAINLEVEL_OUTOF10: 4
PAINLEVEL_OUTOF10: 0

## 2023-06-20 ASSESSMENT — PAIN - FUNCTIONAL ASSESSMENT: PAIN_FUNCTIONAL_ASSESSMENT: 0-10

## 2023-06-20 ASSESSMENT — PAIN DESCRIPTION - LOCATION
LOCATION: BACK;LEG
LOCATION: BACK
LOCATION: FOOT

## 2023-06-20 ASSESSMENT — PAIN DESCRIPTION - ORIENTATION
ORIENTATION: LOWER;RIGHT
ORIENTATION: LOWER

## 2023-06-20 ASSESSMENT — LIFESTYLE VARIABLES: SMOKING_STATUS: 0

## 2023-06-20 NOTE — PROGRESS NOTES
Conferred with Infection preventionist Yelena regarding pre op at home bathing with antibaterial soap (dial). As not CHG based soap, patient does not pass MRSA bundle and will stay on contact isolation.

## 2023-06-20 NOTE — PROGRESS NOTES
1710  Assumed care at this time. Verified PCA with Madonna MEJIA. Patient drowsy and oriented x4. Denies SOB, chest pain. Shows no signs of distress. Patient lungs clear bilaterally. Cap refill < 3 sec to all extremities. Patient has 20 G IV to R arm CDI. Fistula to L forearm with bruit present. Stated pain 10/10. Dressing to back is CDI. Hemovac CDI with no output present. Hdz draining yellow urine. SCDs applied to BLE. Incentive spirometer at bedside. Bowel sounds present. Skin intact. Patient call light and possessions within reach. Bed locked and in lowest position. Left message at this time for OR team regarding hgb.     1857  Left message for OR team requesting call back regarding hgb, insulin orders, and pain level. Patient very drowsy but is restless/awakens sporadically stating he is in severe pain. Encouraging patient to deep breathe, position change. Patient utilizing PCA pump--Co2/respiration monitor in place. 1925  Bedside and verbal shift change report given to [place] [address] by Ines Palafox RN. Report included SBAR, kardex, MAR, and recent results. Has not ambulated post op d/t not having back brace. Significant other to bring in.    1940  Pain still severe per pt statement, but he is resting more calmly in bed without grimacing/groaning. Observed sleeping/resting for longer periods of time. Keon Lim. Notified that HGB is 6.8. No new orders verbalized for hgb result. Also notified that insulin sliding scale order was incorrectly verified. Verbal order: ok to adjust to normal sensitivity sliding scale.

## 2023-06-20 NOTE — OP NOTE
and bilateral lower extremity pain, worsening ability to do activities of daily living. X-rays and MRI scan revealing severe spinal stenosis, degenerative disk disease and disk herniation. Having failed conservative care, he comes for operative intervention. DESCRIPTION OF PROCEDURE: After correct identification, the patient was   taken to the operating room, placed supine on the table, general   endotracheal anesthesia induced. 2grams of Ancef was given. Patient was then rotated prone onto the [facility] table. Lumbar spine was prepped and draped in the usual sterile fashion. Midline incision was made in the lumbar spine, taken down to the spinous processes of L2-5. The posterior transverse processes bilaterally were expose at L2-5 as seen on intraoperative fluoroscopy. Gelpi retractors were then placed. The wound was then irrigated. Complete wide laminectomy was performed at L4 bilaterally and the superior half of L5 bilaterally. Removal of more than 1/2 of the medial facets bilaterally allowed excellent midline decompression. Foraminotomies which included undercutting the pars intra-articularis were then performed bilaterally at L4-5 until a Penfield 3 was easily passed through each of the neural foramen. This allowed visualization of the L4 and L5 nerve roots. The wound   was then irrigated. Bur was then used to open the posterior aspect of the   pedicles bilaterally at L2-5. A gearshift probe was then placed through   each of these posterior bur holes, through the pedicle, into vertebral body   under intraoperative fluoroscopy. Ball-tipped probe was then placed through   each gearshift tracts, ensuring the gearshift had only gone through the bone. Pedicle screws were then placed bilaterally at L2-5. L4 was not instrumented due to the degradation of the bone seen on CT. Rods were then fixed to the pedicle screws using the locking caps. Each of these caps were then torqued into position.

## 2023-06-20 NOTE — PROGRESS NOTES
[address] [facility] Dosing consult for Vancomycin     Vancomycin once dose requested for surgical prophylaxis. MRSA Nasal Swab: showed MRSA positive result on 4/13/23  Pharmacy dosing requested by Dr. Emelina Hernandez. Pt has history of kidney transplant, one functioning kidney. Scr = 1.7  (6/14/23 from Care Everywhere)     CrCl = 45 ml/min     Order entered for Vancomycin 1250 mg IV once, scheduled for 6/20/23. Pertinent Laboratory Values:    Wt Readings from Last 1 Encounters:   06/20/23 197 lb (89.4 kg)     Temp Readings from Last 1 Encounters:   06/20/23 97.5 °F (36.4 °C) (Temporal)       Luis Odom, PharmD  6/20/2023 10:59 AM

## 2023-06-20 NOTE — PROGRESS NOTES
Physical Therapy Goals:  Initiated 6/20/2023 to be met within 1-7 days. Demonstrate proper log rolling technique for safe OOB activities. Supine<>sidelying<>sit w/ S w/ HR for meals  Sit<>stand S w/ LSO/RW in prep for ambulation. Gait >100' w/ LSO/RW and S for home/community ambulation. Ascend/descend >3 steps CGA w/ HR for home entry. Tolerate sitting up in chair 30 minutes for ADL's. Patient/family educaiton to be I w/ safe techniques for safe discharge. []  Patient has met MD devang ortega for d/c home   []  Recommend [facility] with 24 hour adult care   [x]  Benefit from additional acute PT session to address:  transfer training, gait training, stair training    PHYSICAL THERAPY EVALUATION    Patient: Yenny Rolon II (35 y.o. male)  Date: 6/20/2023  Primary Diagnosis: Lumbar spondylosis [M47.816]  Spinal stenosis, lumbar region with neurogenic claudication [M48.062]  Procedure(s) (LRB):  LUMBAR 2 LUMBAR 5 DECOMPRESSION AND FUSION W-C-ARM  (SPEC POP) **CONTACT ISOLATION** OP 23 (N/A) Day of Surgery   Precautions: Fall Risk,  ,  ,  ,  , Spinal Precautions: No Bending, No Lifting, No Twisting,  ,    PLOF: Used SPC/RW as needed    ASSESSMENT :  Based on the objective data described below, the patient presents w/ decreased mobility in regards to bed mobility, transfers, gait quality and tolerance, balance, stair negotiation and safety due to back surgery. Generalized dec strength, pain in back, drowsy state also impacting pt functional mobility. Pt rating pain using numerical pain scale pre/during/post session 10/10. Pt and wife ed regarding mobility safety, back precautions, log roll technique, donning/doffing/use LSO, ice application/use, environmental safety and home safe techniques. Pt in bed upon arrival.  Pt able to perform supine<>sidelying<>sit w/ CGA/min A. Safety vc required throughout session to reinforce safety.   Pt unable to participate in transfers or gait due to wife did not bring in Elizabeth Colmenares(Attending)

## 2023-06-20 NOTE — PERIOP NOTE
Reviewed PTA medication list with patient/caregiver and patient/caregiver denies any additional medications. Patient admits to having a responsible adult care for them at home for at least 24 hours after surgery. Patient encouraged to use gown warming system and informed that using said warming gown to regulate body temperature prior to a procedure has been shown to help reduce the risks of blood clots and infection. Patient's pharmacy of choice verified and documented in PTA medication section. Dual skin assessment & fall risk band verification completed with Kimberly Plascencia. Admission Physical Assessment performed by Spencer Michael RN her findings documented by sylvesterigned.

## 2023-06-20 NOTE — PERIOP NOTE
aware that pt was on dialysis for 8 years until his kidney transplant. He now has only one functioning kidney, orders to repeat BMP. Orders to proceed with LR IVF.

## 2023-06-20 NOTE — ANESTHESIA PRE PROCEDURE
Department of Anesthesiology  Preprocedure Note       Name:  Yenny Rolon II   Age:  60 y.o.  :  1960                                          MRN:  [id number]         Date:  2023      Surgeon: Maryann Galvez):  Aris Gosselin, MD    Procedure: Procedure(s):  LUMBAR 2 LUMBAR 5 DECOMPRESSION AND FUSION W-C-ARM  (SPEC POP) **CONTACT ISOLATION** OP 23    Medications prior to admission:   Prior to Admission medications    Medication Sig Start Date End Date Taking? Authorizing Provider   vitamin E 1000 units capsule Take 1 capsule by mouth daily    Historical Provider, MD   SITagliptin (JANUVIA) 25 MG tablet Take 1 tablet by mouth daily Indications: DM    Historical Provider, MD   Maribavir (LIVTENCITY) 200 MG TABS Take 400 mg by mouth 2 times daily    Historical Provider, MD   mycophenolate (CELLCEPT) 250 MG capsule Take 4 capsules by mouth 2 times daily    Historical Provider, MD   losartan (COZAAR) 50 MG tablet Take 1 tablet by mouth daily Indications: b/p    Historical Provider, MD   Tadalafil 2.5 MG TABS Take by mouth daily    Historical Provider, MD   tamsulosin (FLOMAX) 0.4 MG capsule Take 2 capsules by mouth at bedtime Indications: urine retention    Historical Provider, MD   oxyCODONE (OXYCONTIN) 10 MG extended release tablet Take 1 tablet by mouth as needed for Pain.     Historical Provider, MD   amLODIPine (NORVASC) 5 MG tablet 1 tablet daily Indications: b/p ceived the following from [facility]: Outside name: amLODIPine (NORVASC) 5 mg tablet 5/14/15   Ar Automatic Reconciliation   aspirin 81 MG chewable tablet Take 1 tablet by mouth daily    Ar Automatic Reconciliation   carvedilol (COREG) 12.5 MG tablet Take 0.5 tablets by mouth 2 times daily Indications: b/p 22   Ar Automatic Reconciliation   rosuvastatin (CRESTOR) 10 MG tablet Take 1 tablet by mouth nightly Indications: cholesterol 22   Ar Automatic Reconciliation       Current medications:    Current

## 2023-06-20 NOTE — PROGRESS NOTES
Occupational Therapy Goals:  Initiated 6/20/2023 to be met within 7 days. Patient will perform toileting with mod I  Patient will perform lower body dressing with mod I and A/E PRN  Patient will perform bathroom mobility with mod I  Patient will perform grooming standing at sink with mod I  Patient will perform toilet transfer with mod I       OCCUPATIONAL THERAPY EVALUATION    Patient: Javier Prieto II (71 y.o. male)  Date: 6/20/2023  Primary Diagnosis: Lumbar spondylosis [M47.816]  Spinal stenosis, lumbar region with neurogenic claudication [M48.062]  Procedure(s) (LRB):  LUMBAR 2 LUMBAR 5 DECOMPRESSION AND FUSION W-C-ARM  (SPEC POP) **CONTACT ISOLATION** OP 23 (N/A) Day of Surgery   Precautions: Fall Risk,  ,  ,  ,  , Spinal Precautions: No Bending, No Lifting, No Twisting,  ,    PLOF: pt mod I for ADLs/functional mobility, ambulated with cane/RW as needed    ASSESSMENT :  Based on the objective data described below, the patient presents with BLE decreased ROM and strength affecting LE ADLs. Pt found supine in bed on PCA pump, reporting pain 10/10, agreeable to therapy. Pt reports high pain levels however is very sleepy during session. Pt appears to be fidgeting in bed with twisting motions, educated pt on back precautions and log rolling technique for bed mobility. Pt able to sit up to EOB with CGA/min A and additional time, frequent vc for safety and proper body mechanics. Provided education on adaptive strategies for ADLs in order to maintain back precautions. LSO not present in room, spouse reports she will bring it in later this evening. Pt returned to sidelying with min A, pillow placed between legs, ice applied to lower back. Recommend home with home health at hospital d/c to maximize safety/independence with ADLs    DEFICITS/IMPAIRMENTS:  Performance deficits / Impairments: Decreased functional mobility ; Decreased high-level IADLs;Decreased ADL status; Decreased endurance;Decreased ROM; Decreased

## 2023-06-20 NOTE — PERIOP NOTE
Patient is MRSA +, but completed 3 days home skin prep antibacterial soap (chg wash was not given to him) and 5 days of bactroban. Upon arrival, the patient wiped down with the chg wipes and was given and completed the Nozin nasal swabs and Vancomycin was ordered. Thus, this patient has not completed the bundle and will have to be on contact precautions.

## 2023-06-20 NOTE — PERIOP NOTE
Family updated X Size Of Lesion In Cm (Optional): 0 Introduction Text (Please End With A Colon): The following procedure was deferred: Detail Level: Detailed Procedure To Be Performed At Next Visit: Biopsy by shave method

## 2023-06-20 NOTE — ANESTHESIA POSTPROCEDURE EVALUATION
Post-Anesthesia Evaluation & Assessment    Vitals  BP: (!) 136/57  Temp: 97.5 °F (36.4 °C)  Temp Source: Temporal  Pulse: 65  Respirations: 17  SpO2: 100 %  Height: 5' 5.5\" (166.4 cm)  Weight - Scale: 197 lb (89.4 kg)  Pain Level: 0    Nausea/Vomiting: Controlled. Post-operative hydration adequate. Pain managed. Mental status & Level of consciousness: alert and oriented x 3    Neurological status: moves all extremities, sensation grossly intact    Pulmonary status: airway patent, adequate oxygenation. Complications related to anesthesia: none    Patient has met all PACU discharge requirements.       Jessica Germain, DO

## 2023-06-20 NOTE — PROGRESS NOTES
[address] [facility] Dosing consult for Vancomycin     Vancomycin once post-op dose requested for surgical prophylaxis. MRSA Nasal Swab: showed MRSA positive result on 4/13/23  Pharmacy dosing requested by DOLORES Logan  Pt has history of kidney transplant, one functioning kidney. Scr = 1.53   CrCl = 51 ml/min    Pre-op dose: Vancomycin 1250 mg IV once,  administered 6/20/23 at 12:07  Order entered for Vancomycin 1250 mg IV once, scheduled for 6/21/23 at 00:00    Pertinent Laboratory Values:    Wt Readings from Last 1 Encounters:   06/20/23 197 lb (89.4 kg)     Temp Readings from Last 1 Encounters:   06/20/23 97.5 °F (36.4 °C) (Oral)       Melonie Panchal, MikeD

## 2023-06-20 NOTE — INTERVAL H&P NOTE
Update History & Physical    The patient's History and Physical was reviewed with the patient and I examined the patient. There was no change. The surgical site was confirmed by the patient and me. Plan: The risks, benefits, expected outcome, and alternative to the recommended procedure have been discussed with the patient. Patient understands and wants to proceed with the procedure.      Electronically signed by Parag Collins MD on 6/20/2023 at 11:23 AM

## 2023-06-21 PROBLEM — M51.26 HNP (HERNIATED NUCLEUS PULPOSUS), LUMBAR: Status: RESOLVED | Noted: 2023-04-10 | Resolved: 2023-06-21

## 2023-06-21 PROBLEM — M48.062 SPINAL STENOSIS, LUMBAR REGION WITH NEUROGENIC CLAUDICATION: Status: RESOLVED | Noted: 2023-04-10 | Resolved: 2023-06-21

## 2023-06-21 LAB
ANION GAP SERPL CALC-SCNC: 10 MMOL/L (ref 3–18)
BUN SERPL-MCNC: 30 MG/DL (ref 7–18)
BUN/CREAT SERPL: 26 (ref 12–20)
CALCIUM SERPL-MCNC: 7.9 MG/DL (ref 8.5–10.1)
CHLORIDE SERPL-SCNC: 117 MMOL/L (ref 100–111)
CO2 SERPL-SCNC: 17 MMOL/L (ref 21–32)
CREAT SERPL-MCNC: 1.14 MG/DL (ref 0.6–1.3)
FERRITIN SERPL-MCNC: 503 NG/ML (ref 8–388)
GLUCOSE BLD STRIP.AUTO-MCNC: 114 MG/DL (ref 70–110)
GLUCOSE BLD STRIP.AUTO-MCNC: 127 MG/DL (ref 70–110)
GLUCOSE BLD STRIP.AUTO-MCNC: 140 MG/DL (ref 70–110)
GLUCOSE BLD STRIP.AUTO-MCNC: 148 MG/DL (ref 70–110)
GLUCOSE SERPL-MCNC: 91 MG/DL (ref 74–99)
HCT VFR BLD AUTO: 20.9 % (ref 36–48)
HGB BLD-MCNC: 6.4 G/DL (ref 13–16)
IRON SATN MFR SERPL: 22 % (ref 20–50)
IRON SERPL-MCNC: 28 UG/DL (ref 50–175)
POTASSIUM SERPL-SCNC: 4.4 MMOL/L (ref 3.5–5.5)
SODIUM SERPL-SCNC: 144 MMOL/L (ref 136–145)
TIBC SERPL-MCNC: 130 UG/DL (ref 250–450)

## 2023-06-21 PROCEDURE — 6370000000 HC RX 637 (ALT 250 FOR IP): Performed by: PHYSICIAN ASSISTANT

## 2023-06-21 PROCEDURE — 6360000002 HC RX W HCPCS: Performed by: PHYSICIAN ASSISTANT

## 2023-06-21 PROCEDURE — 82728 ASSAY OF FERRITIN: CPT

## 2023-06-21 PROCEDURE — 6370000000 HC RX 637 (ALT 250 FOR IP): Performed by: STUDENT IN AN ORGANIZED HEALTH CARE EDUCATION/TRAINING PROGRAM

## 2023-06-21 PROCEDURE — 82962 GLUCOSE BLOOD TEST: CPT

## 2023-06-21 PROCEDURE — 80048 BASIC METABOLIC PNL TOTAL CA: CPT

## 2023-06-21 PROCEDURE — 85018 HEMOGLOBIN: CPT

## 2023-06-21 PROCEDURE — 2580000003 HC RX 258: Performed by: ORTHOPAEDIC SURGERY

## 2023-06-21 PROCEDURE — 83550 IRON BINDING TEST: CPT

## 2023-06-21 PROCEDURE — 85014 HEMATOCRIT: CPT

## 2023-06-21 PROCEDURE — 97116 GAIT TRAINING THERAPY: CPT

## 2023-06-21 PROCEDURE — 2580000003 HC RX 258: Performed by: PHYSICIAN ASSISTANT

## 2023-06-21 PROCEDURE — 36415 COLL VENOUS BLD VENIPUNCTURE: CPT

## 2023-06-21 PROCEDURE — 97530 THERAPEUTIC ACTIVITIES: CPT

## 2023-06-21 PROCEDURE — 83540 ASSAY OF IRON: CPT

## 2023-06-21 RX ORDER — PREDNISONE 5 MG/1
5 TABLET ORAL DAILY
Status: DISCONTINUED | OUTPATIENT
Start: 2023-06-21 | End: 2023-06-22 | Stop reason: HOSPADM

## 2023-06-21 RX ORDER — NALOXONE HYDROCHLORIDE 4 MG/.1ML
1 SPRAY NASAL PRN
Qty: 1 EACH | Refills: 0 | Status: SHIPPED | OUTPATIENT
Start: 2023-06-21

## 2023-06-21 RX ORDER — FLUCONAZOLE 100 MG/1
200 TABLET ORAL DAILY
Status: DISCONTINUED | OUTPATIENT
Start: 2023-06-21 | End: 2023-06-22 | Stop reason: HOSPADM

## 2023-06-21 RX ORDER — CYCLOBENZAPRINE HCL 10 MG
10 TABLET ORAL ONCE
Status: COMPLETED | OUTPATIENT
Start: 2023-06-21 | End: 2023-06-21

## 2023-06-21 RX ORDER — POLYETHYLENE GLYCOL 3350 17 G/17G
17 POWDER, FOR SOLUTION ORAL 2 TIMES DAILY PRN
Qty: 28 G | Refills: 1 | Status: SHIPPED | OUTPATIENT
Start: 2023-06-21 | End: 2023-07-05

## 2023-06-21 RX ORDER — HYDROMORPHONE HYDROCHLORIDE 2 MG/1
2 TABLET ORAL EVERY 4 HOURS PRN
Qty: 42 TABLET | Refills: 0 | Status: SHIPPED | OUTPATIENT
Start: 2023-06-21 | End: 2023-06-22 | Stop reason: SDUPTHER

## 2023-06-21 RX ORDER — CYCLOBENZAPRINE HCL 10 MG
10 TABLET ORAL 3 TIMES DAILY PRN
Qty: 30 TABLET | Refills: 0 | Status: SHIPPED | OUTPATIENT
Start: 2023-06-21 | End: 2023-07-01

## 2023-06-21 RX ADMIN — OXYCODONE HYDROCHLORIDE 10 MG: 10 TABLET, FILM COATED, EXTENDED RELEASE ORAL at 22:57

## 2023-06-21 RX ADMIN — CYCLOBENZAPRINE 10 MG: 10 TABLET, FILM COATED ORAL at 18:15

## 2023-06-21 RX ADMIN — MYCOPHENOLATE MOFETIL 1000 MG: 250 CAPSULE ORAL at 08:58

## 2023-06-21 RX ADMIN — HYDROMORPHONE HYDROCHLORIDE 2 MG: 2 TABLET ORAL at 05:54

## 2023-06-21 RX ADMIN — HYDROMORPHONE HYDROCHLORIDE 2 MG: 2 TABLET ORAL at 10:56

## 2023-06-21 RX ADMIN — CARVEDILOL 6.25 MG: 3.12 TABLET, FILM COATED ORAL at 08:51

## 2023-06-21 RX ADMIN — POLYETHYLENE GLYCOL 3350 17 G: 17 POWDER, FOR SOLUTION ORAL at 08:52

## 2023-06-21 RX ADMIN — SODIUM CHLORIDE, SODIUM LACTATE, POTASSIUM CHLORIDE, AND CALCIUM CHLORIDE: 600; 310; 30; 20 INJECTION, SOLUTION INTRAVENOUS at 18:19

## 2023-06-21 RX ADMIN — BISACODYL 5 MG: 5 TABLET, COATED ORAL at 08:51

## 2023-06-21 RX ADMIN — CYCLOBENZAPRINE 10 MG: 10 TABLET, FILM COATED ORAL at 11:56

## 2023-06-21 RX ADMIN — Medication 1200 UNITS: at 08:58

## 2023-06-21 RX ADMIN — LOSARTAN POTASSIUM 50 MG: 50 TABLET, FILM COATED ORAL at 08:51

## 2023-06-21 RX ADMIN — PREDNISONE 5 MG: 5 TABLET ORAL at 10:56

## 2023-06-21 RX ADMIN — ROSUVASTATIN CALCIUM 10 MG: 10 TABLET, COATED ORAL at 22:56

## 2023-06-21 RX ADMIN — CARVEDILOL 6.25 MG: 3.12 TABLET, FILM COATED ORAL at 22:56

## 2023-06-21 RX ADMIN — MYCOPHENOLATE MOFETIL 1000 MG: 250 CAPSULE ORAL at 22:42

## 2023-06-21 RX ADMIN — HYDROMORPHONE HYDROCHLORIDE 2 MG: 2 TABLET ORAL at 14:59

## 2023-06-21 RX ADMIN — OXYCODONE HYDROCHLORIDE 10 MG: 10 TABLET, FILM COATED, EXTENDED RELEASE ORAL at 08:51

## 2023-06-21 RX ADMIN — SODIUM CHLORIDE, PRESERVATIVE FREE 10 ML: 5 INJECTION INTRAVENOUS at 08:52

## 2023-06-21 RX ADMIN — AMLODIPINE BESYLATE 5 MG: 5 TABLET ORAL at 08:51

## 2023-06-21 RX ADMIN — TAMSULOSIN HYDROCHLORIDE 0.8 MG: 0.4 CAPSULE ORAL at 22:56

## 2023-06-21 RX ADMIN — SODIUM CHLORIDE, PRESERVATIVE FREE 10 ML: 5 INJECTION INTRAVENOUS at 08:53

## 2023-06-21 RX ADMIN — CYCLOBENZAPRINE 10 MG: 10 TABLET, FILM COATED ORAL at 06:59

## 2023-06-21 ASSESSMENT — PAIN DESCRIPTION - LOCATION
LOCATION: BACK
LOCATION: BACK;LEG

## 2023-06-21 ASSESSMENT — PAIN SCALES - GENERAL
PAINLEVEL_OUTOF10: 8
PAINLEVEL_OUTOF10: 9
PAINLEVEL_OUTOF10: 9
PAINLEVEL_OUTOF10: 8
PAINLEVEL_OUTOF10: 3

## 2023-06-21 ASSESSMENT — PAIN DESCRIPTION - ORIENTATION
ORIENTATION: RIGHT
ORIENTATION: MID
ORIENTATION: POSTERIOR

## 2023-06-21 ASSESSMENT — PAIN DESCRIPTION - DESCRIPTORS
DESCRIPTORS: ACHING
DESCRIPTORS: ACHING;THROBBING

## 2023-06-21 NOTE — PROGRESS NOTES
This RN introduced self to patient. No needs or questions voiced at this time. Whiteboard is updated, and call light left in reach of patient.

## 2023-06-21 NOTE — PROGRESS NOTES
Physical Therapy Goals:  Initiated 6/21/2023 to be met within 7-10 days. []  Patient has met MD devang ortega for d/c home   []  Recommend HH with 24 hour adult care   [x]  Benefit from additional acute PT session to address:  rehab placement      PHYSICAL THERAPY TREATMENT    Patient: Malcom Vizcaino II (12 y.o. male)  Date: 6/21/2023  Diagnosis: Lumbar spondylosis [M47.816]  Spinal stenosis, lumbar region with neurogenic claudication [M48.062] <principal problem not specified>  Procedure(s) (LRB):  LUMBAR 2 LUMBAR 5 DECOMPRESSION AND FUSION W-C-ARM  (SPEC POP) **CONTACT ISOLATION** OP 23 (N/A) 1 Day Post-Op  Precautions: Fall Risk,  ,  ,  ,  , Spinal Precautions: No Bending, No Lifting, No Twisting,  ,    PLOF: ambulatory with a cane, needs a RW    ASSESSMENT:  Assessment  Assessment: Pt sitting in recliner upon arrival.  Reports 9/10 pain and that the oxy is not lasting very long (pt had oxy 1.5 hours before PT). Agreeable to attempt PT. Fastened LSO then scooted to edge of chair with increased time and long pauses due to R LE pain. Pt tends to shift wt to the L causing lateral bending. Educated on lumbar precautions. Eb for sit to stand. Pt performed standing marches with RW.  C/o dizziness (Hgb 6.4). Ambulated 3ft forward/backward with CGA. No LOB, mild RLE buckling noted. Pt sat EOB. Doffed and pt given instruction for proper doffing. ModA into bed using log roll technique. Pt unable to shift or scoot hips, bed controls utilized to scoot up to [facility]. Ice packs applied to R hip/thigh. Notifed nurse, Ortho educator and Care mgmt that the pt will likely not be able to return home, SNF is recommended. .  Activity Tolerance: Patient limited by pain  Discharge Recommendations: Subacute/Skilled Nursing Facility    Progression toward goals:   []      Improving appropriately and progressing toward goals  [x]      Improving slowly and progressing toward goals  []      Not making progress

## 2023-06-21 NOTE — CONSULTS
RENAL CONSULT  2023    Patient:  Yenny Rolon II  :  1960  Gender:  male  MRN #:  [id number]    Reason for Consult:  CKD management in renal transplant patient     History of Present Illness:    Yenny Rolon II is a 60y.o. year old male   With h/o ESRD on dialysis for 8 years before he had cadaveric renal transplant in May 2022 at [facility] admitted here after lumbar fusion surgery for lumbar spondylosis     Nephrology consult for over CKD , immunosuppression management   When I saw him this morning he complaints of back pain , has not had urine output since Hdz's catheter was out  Denied abdominal pain , chest pain and fever   No other symptoms       Past Medical History:   Diagnosis Date    Anemia in chronic kidney disease     CHF (congestive heart failure) ([address]     pt states managed by Dr. Avani Ramirez- pt denies chf exacerbation episode in last 80 days    Chronic kidney disease     Coronary artery disease involving native coronary artery of native heart without angina pectoris     Diabetes ([facility] [address]     Exercise tolerance finding 2023    pt states able to climb 1 flight of stairs without CP or SOB, [facility], lyft weights and ride exercise bike.     History of blood transfusion         [place] (hard of hearing) 2023    right ear- denies wearing a hearing aid- was able to hear phone conversation without difficulty    Hypertension 2023    managed by Dr. Avani Ramirez    Kidney transplant status, cadaveric 2022    [facility]    NSTEMI (non-ST elevated myocardial infarction) [facility])     Pacemaker     pt does not recal company, states he calls Dr. Andrez Brooks when he has problems with it    Second degree AV block, Mobitz type I     Second degree heart block 2023    Dr. Andrez Brooks    Sleep apnea 2023    pt states resolved- denies using c-pap    Stroke [facility]) 2014    weakness left side, impaired balance    Type 2 diabetes mellitus with chronic

## 2023-06-21 NOTE — DISCHARGE INSTRUCTIONS
3.  You should NEVER drive while taking narcotic medication. RETURN TO WORK :  1. The decision to return to work will be determined on an individual basis. 2.  Many people who have a strenuous job (construction, heavy labor, etc) may need to be off work for up to 12 weeks. 3.  If you need a work note, please let us know as soon as possible, and not the same day you are planning to return to work. NUTRITION :  1.  Good nutrition is an essential part of healing. 2.  You should eat a balanced diet each day, including fruits, vegetables, dairy products and protein. 3.  Remember to drink plenty of water. 4.  If you have not had a bowel movement within 3 days of surgery, you will need to use a laxative or suppository that can be obtained over-the-counter at your local pharmacy. BONE STIMULATOR:  1. A spinal bone stimulator may be prescribed for you under certain situations. 2.  A nurse will call you if one has been requested and discuss its use for approximately 4-6 months post-op every day. 3.  This device assists in bone healing and fusion. MEDICATIONS -  1. You may resume the medications you were taking before surgery, with the general exception of (or DO NOT TAKE) non-steroidal anti-inflammatory medications, such as Motrin, Aleve, Advil Naprosyn, Ibuprofen or aspirin. 2.  You will receive a prescription for pain medication at discharge from the hospital. The pain medication works best if taken before the pain becomes severe. 3.  To reduce stomach upset, always take the medication with food. 4.  Begin to wean yourself off the pain medication during the second week after discharge. 5.  If you need a refill, please call the office during working hours at least 2 days before your prescription runs out. Do not wait until your bottle is empty to call for a refill. 6.  DO NOT drive if you are taking narcotic pain medications.     HOME HEALTH CARE:  1.   A home health care service has

## 2023-06-21 NOTE — CARE COORDINATION
D/c plan; SNF pending acceptance and insurance auth. CM spoke w/ pt. Confirmed information on the pt's face sheet. Pt lives at home w/ his significant other. Pt is independent in all ADLs and IDLs at baseline without an assistive device. The pt states he doesn't have a RW for his recovery. Discussed therapy's recommendation of SNF at d/c. Pt is in agreement. [address] list provided to pt. Pt would like referrals to be sent to area SNF's and will pick a facility based on acceptances. CMS to send referrals. CM to follow-up. Case Management Assessment  Initial Evaluation    Date/Time of Evaluation: 6/21/2023 11:14 AM  Assessment Completed by: Jhoana Steele RN    If patient is discharged prior to next notation, then this note serves as note for discharge by case management. Patient Name: Siegfried Skiff II                   YOB: 1960  Diagnosis: Lumbar spondylosis [M47.816]  Spinal stenosis, lumbar region with neurogenic claudication [M48.062]                   Date / Time: 6/20/2023  9:38 AM    Patient Admission Status: Outpatient in a bed   Readmission Risk (Low < 19, Mod (19-27), High > 27): No data recorded  Current PCP: Mendoza Maynard, DO  PCP verified by CM? Yes    Chart Reviewed: Yes      History Provided by: Patient  Patient Orientation: Alert and Oriented    Patient Cognition: Alert    Hospitalization in the last 30 days (Readmission):  No    If yes, Readmission Assessment in CM Navigator will be completed.     Advance Directives:      Code Status: Full Code   Patient's Primary Decision Maker is:        Discharge Planning:    Patient lives with: Spouse/Significant Other Type of Home: House  Primary Care Giver: Self  Patient Support Systems include: Spouse/Significant Other   Current Financial resources: Medicare  Current community resources: None  Current services prior to admission: None            Current DME:              Type of Home Care services:  None    ADLS  Prior functional

## 2023-06-21 NOTE — PROGRESS NOTES
1925-Bedside report received from [facility]. Pt A & O X 4. Pain at 2, on PCA Dilaudid. Pt without any issues. Call light within reach. 2005-Pt resting in bed at this time. IV site to RUE  patent and intact. Pt A & O x 4. LS clear, on CO2 monitor. Drsg to back CDI. Drain with sero sang drge. + CSM. Pain at 3. + BS to all 4 quadrants. Pt denies nausea. Dr Simone Arizmendi aware of the low HGB at 6.8, no new orders. Call light within raech. Pt denies any needs at this time. 2150-Pt reports not getting comfortable, but has generally been very drowsy, CO2 monitor keeps beeping. Discouraged from using the pain button. 0530-Pt's CO2 monitor keeps going off . Pt however says Pain medicine is not working. Will D/C PCA and give Dilaudid 2 mg. Will leave monitor on.    0530-H & H ordered just drawn by phlebotomist.    Letta Nails D/C'd. Pt aware to measure urine and to drink more fluids. 0642-Pt moaning and loud, c/o R leg pain \"that was there before surgery\". [facility] the PA in to see pt, Dr Alex Melgar came in too. The PA to order a muscle relaxant. Team also aware of the low H & H, Dr Alex Melgar said it's chronic for pt\". Awaiting AM draw results. Pt in NAD at bedside report  No PCA dilaudid 30 mg showing in the [place] advised us to indicate in a note how much was wasted. 24.5 mg of Dilaudid wasted, witnessed by Halley Wilks, RN.  Call bell within reach

## 2023-06-21 NOTE — PROGRESS NOTES
Physical Therapy Goals:  Initiated 6/21/2023 to be met within 7-10 days. []  Patient has met MD devang ortega for d/c home   []  Recommend HH with 24 hour adult care   [x]  Benefit from additional acute PT session to address:  rehab placement      PHYSICAL THERAPY TREATMENT    Patient: Kusum Whitmore II (22 y.o. male)  Date: 6/21/2023  Diagnosis: Lumbar spondylosis [M47.816]  Spinal stenosis, lumbar region with neurogenic claudication [M48.062] <principal problem not specified>  Procedure(s) (LRB):  LUMBAR 2 LUMBAR 5 DECOMPRESSION AND FUSION W-C-ARM  (SPEC POP) **CONTACT ISOLATION** OP 23 (N/A) 1 Day Post-Op  Precautions: Fall Risk,  ,  ,  ,  , Spinal Precautions: No Bending, No Lifting, No Twisting,  ,    PLOF: ambulatory with a cane, needs a RW    ASSESSMENT:  Assessment  Assessment: Pt in bed upon arrival.  VC and Megan for log roll techinque, modA to sit up EOB. Elevated bed to assist with sit to stand. Ambulated with RW, 20ft, decreased step heigth and length on (B)LEs, very slow pace, no LOB. Returned to sitting EOB to attempt voiding, unable. Assist with with standing and pt able to void 275 cc. ModA back into bed. Pt in side lying position, pillow placed in between knees. Activity Tolerance: Patient limited by pain  Discharge Recommendations: Subacute/Skilled Nursing Facility    Progression toward goals:   []      Improving appropriately and progressing toward goals  [x]      Improving slowly and progressing toward goals  []      Not making progress toward goals and plan of care will be adjusted     PLAN:  Patient continues to benefit from skilled intervention to address the above impairments. Continue treatment per established plan of care.     Further Equipment Recommendations for Discharge:  RW  Discharge Recommendation: Discharge Recommendations: [address]: 12/20    This [facility] score should be considered in conjunction with interdisciplinary team

## 2023-06-21 NOTE — NURSE NAVIGATOR
[place]  II post spine rounding. Patient educated: Mobility Intervention:       [] Pt dangled at edge of bed    [] Pt assisted OOB to bedside commode    [x] Pt assisted OOB to chair    [] Pt ambulated to bathroom    [] Patient was ambulated in room/hallway    Assistive Device Utilized:       [x] Rolling walker   [] Crutches   [x] Back Brace   [] Knee immobilizer   [] Neck Collar    After Rounding and Checking on Patient     [x] Patient left in no apparent distress sitting up in chair  [] Patient left in no apparent distress in bed  [x] Call bell left within reach  [] SCDs on both legs & machine turned on  [] Knee immobilizer on  [x] Ice applied  [x] RN notified  [x]  present  [] Bed alarm activated    Complaining of pain down his right leg. Ice and assisted to sit in a recliner. Pain level improved after changing positions. Left patient with call light, cell phone and personal belongings in reach for safety. Activity:  OOB for all meals, walk every hour to prevent blood clots  If your surgeon wants you to wear a back brace. Wear it per your surgeon's instructions. Promoting Circulation:   Use SCD pumps except when walking. Ankle pumps 10 times an hour at hospital & home. Pain Control:  Pain medications side effects discussed. Use ice, distraction, moving, & change position to also help with pain. Narcotics and anesthesia cause constipation so it is important to take stool softener/mild laxative daily while on narcotics. Incentive Spirometry:    Use of incentive spirometer 10 x/hr. Diet:   Eat for healing. Drink enough fluids so urine is the color of lemonade. Medication causes nausea and dizziness if taken on an empty stomach so instructed to make sure to eat a snack before taking any medicaitons. Patient Safety:   Call light & belongings in reach. Call for help when want to walk or get OOB. [place]  II verbalized understanding but needs reinforcement.   Given the

## 2023-06-21 NOTE — PROGRESS NOTES
Progress Note POD #1      Patient: Malcom Vizcaino II               Sex: male          DOA: 6/20/2023         YOB: 1960      Surgery: Procedure(s):  LUMBAR 2 LUMBAR 5 DECOMPRESSION AND FUSION W-C-ARM  (SPEC POP) **CONTACT ISOLATION** OP 23           LOS: 0 days               Subjective:     C/O right leg pain started this AM, same pain as before surgery. Objective:      Visit Vitals  BP (!) 142/55   Pulse 74   Temp 98.2 °F (36.8 °C)   Resp 16   Ht 5' 5.5\" (1.664 m)   Wt 197 lb (89.4 kg)   SpO2 100%   BMI 32.28 kg/m²       Physical Exam:  Neurological: motor strength: 5/5 in lower extremities bilaterally                          sensation: intact to light touch  Patient mobility                         Intake and Output:  Current Shift:  06/20 1901 - 06/21 0700  In: 509 [P.O.:150; I.V.:359]  Out: 775 [Urine:775]  Last three shifts:  06/19 0701 - 06/20 1900  In: 1888.9 [I.V.:1400]  Out: 1100 [Urine:250]    Lab/Data Reviewed:    Lab/Data Reviewed:  Lab Results   Component Value Date/Time    HGB 6.8 06/20/2023 03:58 PM    HCT 22.6 06/20/2023 03:58 PM     No results found for: APTT  No results found for: INR, PTMR, PT1         Assessment/Plan     Active Problems:    * No active hospital problems. *  Resolved Problems:    * No resolved hospital problems. *      1. Stable  2. OOB with PT  3. D/C Planning  4. D/C PCA  5. D/C goldstein  6. D/C drain & change dressing prior to discharge home. 7.Discharge to home after being cleared by P.T  8. Follow-up in 10 days at [facility] with Dr. Alex Melgar. 9. Post op spasms right LE- will give muscle relaxer now & work on ambulating.

## 2023-06-22 VITALS
HEIGHT: 66 IN | TEMPERATURE: 99.4 F | BODY MASS INDEX: 31.66 KG/M2 | SYSTOLIC BLOOD PRESSURE: 131 MMHG | DIASTOLIC BLOOD PRESSURE: 51 MMHG | OXYGEN SATURATION: 97 % | RESPIRATION RATE: 16 BRPM | WEIGHT: 197 LBS | HEART RATE: 81 BPM

## 2023-06-22 PROBLEM — M43.10 SPONDYLOLISTHESIS: Status: ACTIVE | Noted: 2023-06-22

## 2023-06-22 LAB
GLUCOSE BLD STRIP.AUTO-MCNC: 113 MG/DL (ref 70–110)
GLUCOSE BLD STRIP.AUTO-MCNC: 120 MG/DL (ref 70–110)
HCT VFR BLD AUTO: 22.2 % (ref 36–48)
HGB BLD-MCNC: 6.8 G/DL (ref 13–16)

## 2023-06-22 PROCEDURE — 85014 HEMATOCRIT: CPT

## 2023-06-22 PROCEDURE — G0378 HOSPITAL OBSERVATION PER HR: HCPCS

## 2023-06-22 PROCEDURE — 36415 COLL VENOUS BLD VENIPUNCTURE: CPT

## 2023-06-22 PROCEDURE — 6370000000 HC RX 637 (ALT 250 FOR IP): Performed by: STUDENT IN AN ORGANIZED HEALTH CARE EDUCATION/TRAINING PROGRAM

## 2023-06-22 PROCEDURE — 6360000002 HC RX W HCPCS: Performed by: PHYSICIAN ASSISTANT

## 2023-06-22 PROCEDURE — 82962 GLUCOSE BLOOD TEST: CPT

## 2023-06-22 PROCEDURE — 2580000003 HC RX 258: Performed by: PHYSICIAN ASSISTANT

## 2023-06-22 PROCEDURE — 85018 HEMOGLOBIN: CPT

## 2023-06-22 PROCEDURE — 6370000000 HC RX 637 (ALT 250 FOR IP): Performed by: PHYSICIAN ASSISTANT

## 2023-06-22 RX ORDER — SODIUM BICARBONATE 650 MG/1
650 TABLET ORAL 2 TIMES DAILY
Status: DISCONTINUED | OUTPATIENT
Start: 2023-06-22 | End: 2023-06-22 | Stop reason: HOSPADM

## 2023-06-22 RX ORDER — OXYCODONE HCL 10 MG/1
10 TABLET, FILM COATED, EXTENDED RELEASE ORAL EVERY 12 HOURS
Qty: 30 EACH | Refills: 0 | Status: SHIPPED | OUTPATIENT
Start: 2023-06-22 | End: 2023-07-07

## 2023-06-22 RX ORDER — HYDROMORPHONE HYDROCHLORIDE 2 MG/1
2 TABLET ORAL EVERY 4 HOURS PRN
Qty: 42 TABLET | Refills: 0 | Status: SHIPPED | OUTPATIENT
Start: 2023-06-22 | End: 2023-06-29

## 2023-06-22 RX ADMIN — PREDNISONE 5 MG: 5 TABLET ORAL at 08:07

## 2023-06-22 RX ADMIN — OXYCODONE HYDROCHLORIDE 10 MG: 10 TABLET, FILM COATED, EXTENDED RELEASE ORAL at 08:07

## 2023-06-22 RX ADMIN — CYCLOBENZAPRINE 10 MG: 10 TABLET, FILM COATED ORAL at 12:00

## 2023-06-22 RX ADMIN — BISACODYL 5 MG: 5 TABLET, COATED ORAL at 08:07

## 2023-06-22 RX ADMIN — HYDROMORPHONE HYDROCHLORIDE 2 MG: 2 TABLET ORAL at 03:37

## 2023-06-22 RX ADMIN — LOSARTAN POTASSIUM 50 MG: 50 TABLET, FILM COATED ORAL at 08:07

## 2023-06-22 RX ADMIN — MYCOPHENOLATE MOFETIL 1000 MG: 250 CAPSULE ORAL at 08:05

## 2023-06-22 RX ADMIN — SODIUM BICARBONATE 650 MG: 650 TABLET ORAL at 11:57

## 2023-06-22 RX ADMIN — SODIUM CHLORIDE, PRESERVATIVE FREE 10 ML: 5 INJECTION INTRAVENOUS at 08:07

## 2023-06-22 RX ADMIN — AMLODIPINE BESYLATE 5 MG: 5 TABLET ORAL at 08:07

## 2023-06-22 RX ADMIN — POLYETHYLENE GLYCOL 3350 17 G: 17 POWDER, FOR SOLUTION ORAL at 08:04

## 2023-06-22 RX ADMIN — CARVEDILOL 6.25 MG: 3.12 TABLET, FILM COATED ORAL at 08:06

## 2023-06-22 RX ADMIN — Medication 1200 UNITS: at 08:05

## 2023-06-22 RX ADMIN — FLUCONAZOLE 200 MG: 100 TABLET ORAL at 08:05

## 2023-06-22 ASSESSMENT — PAIN DESCRIPTION - ORIENTATION: ORIENTATION: POSTERIOR

## 2023-06-22 ASSESSMENT — PAIN SCALES - GENERAL
PAINLEVEL_OUTOF10: 8
PAINLEVEL_OUTOF10: 10

## 2023-06-22 ASSESSMENT — PAIN DESCRIPTION - DESCRIPTORS: DESCRIPTORS: ACHING;THROBBING

## 2023-06-22 ASSESSMENT — PAIN DESCRIPTION - LOCATION: LOCATION: BACK

## 2023-06-22 NOTE — PROGRESS NOTES
RENAL CONSULT PROGRESS NOTE   2023    Patient:  Lizet Schwab II  :  1960  Gender:  male  MRN #:  [id number]    Reason for Consult:  CKD management in renal transplant patient     Subjective:  Says he feels better, BP stable  Urinating fine   Reports back pain   No other symptoms       Objective:    BP (!) 131/51   Pulse 81   Temp 99.4 °F (37.4 °C) (Oral)   Resp 16   Ht 5' 5.5\" (1.664 m)   Wt 197 lb (89.4 kg)   SpO2 97%   BMI 32.28 kg/m²     Physical Exam:    Pt awake,  alert and comfortable  Lung: clear to auscultation  Ext: no edema   CNS- Oriented to time , place and person     Laboratory Data:    Lab Results   Component Value Date    BUN 30 (H) 2023    BUN 36 (H) 2023     2023     2023    CO2 17 (L) 2023    CO2 22 2023    GLUCOSE 91 2023    GLUCOSE 127 (H) 2023     Lab Results   Component Value Date    HGB 6.8 (L) 2023    HCT 22.2 (L) 2023     Lab Results   Component Value Date    CALCIUM 7.9 (L) 2023     No results found for: CHOLESTEROL, HDL  No results found for: SPECIMENTYP, TURBIDITY    Imaging Reveiwed:         Assessment:    Lizet Schwab II is a 60y.o. 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  His great function seems to be stable like in the past. Has remained in CKD   He has significant anemia  with slow improvement in hemoglobin       Renal transplant  CKD stage 3GA  Hypertension  DM  Anemia      Plan:    Patient examined and labs reviewed- Graft function is stable , creatinine is improving   - stop IV fluid, encourage oral intake    - continue MMF 1000 MG BID  - prednisone 5 mg daily  - Based on care every where he is supposed to

## 2023-06-22 NOTE — DISCHARGE SUMMARY
Discharge Summary    Patient: Anuel Chávez               Sex: male          DOA: 6/20/2023         YOB: 1960      Age:  60 y.o.        LOS:  LOS: 2 days                Admit Date: 6/20/2023    Discharge Date: 6/22/2023    Admission Diagnoses: Lumbar spondylosis [M47.816]  Spinal stenosis, lumbar region with neurogenic claudication [M48.062]  Spondylolisthesis [M43.10]    Discharge Diagnoses:  Lumbar spondylosis [M47.816]  Spinal stenosis, lumbar region with neurogenic claudication [M48.062]  Spondylolisthesis [M43.10]    Discharge Condition: Good    Hospital Course: The patient underwent L2-5 decompression  & fusion on 6/20/2023. The patient tolerated the procedure well. Vital signs remained stable and the patient was transferred to  2nd floor surgical unit without complications. The patient remained neurovascularly intact and began getting out of bed with physical therapy on  POD #1. Pain was controlled with Dilaudid PCA and   dilaudid pills for break through pain. The PCA pump and goldstein catheter were discontinued on POD#1. The drain was discontinued on POD#2. The patient was slow to progress with physical therapy, required assistance to ambulate with poor balance, and is lacking assistance at home. SNF is the best option. The patient will require transportation to SNF and will be transferred when bed available. The patient progressed with physical therapy and was ambulating 20 feet on POD #1 and was therefore transferred to SNF on POD #2. Acute blood loss anemia was monitored, patient remained asymptomatic but will have blood transfusion if Hgb drops below 6.4 today.       Consults: Nephrology    Discharge disposition: SNF     Significant Diagnostic Studies:   Recent Labs     06/20/23  1021 06/20/23  1558 06/21/23  0536   HGB 7.7* 6.8* 6.4*     Recent Labs     06/20/23  1021 06/20/23  1558 06/21/23  0536   HCT 24.9* 22.6* 20.9*          Medication List        START taking these medications

## 2023-06-22 NOTE — PROGRESS NOTES
0730 Patient drain to back will not decompress. Dr. Franky Torres was made aware. MD gave verbal order to remove hemovac drain. Patient stable. Will continue to monitor. 0830 Upon assessment of surgical incision, RN observed hemovac drain already removed. There is observable edema surrounding the surgical incision bilaterally.  Dr. Franky Torres and Deepak BERNAL made aware at 5442 AM.

## 2023-06-22 NOTE — PROGRESS NOTES
Physical Therapy    1021: Pt sound asleep, snoring, did not arouse to verbal stimuli, will follow up again for PT.    1227: Pt asleep still, did not arouse to verbal stim, will continue to follow up for PT.

## 2023-06-22 NOTE — PROGRESS NOTES
Progress Note POD #      Patient: Taty Swan II               Sex: male          DOA: 6/20/2023         YOB: 1960      Surgery: Procedure(s):  LUMBAR 2 LUMBAR 5 DECOMPRESSION AND FUSION W-C-ARM  (SPEC POP) **CONTACT ISOLATION** OP 23           LOS: 0 days               Subjective:     No new complaints    Objective:      Visit Vitals  BP (!) 139/56   Pulse 81   Temp 98.4 °F (36.9 °C) (Oral)   Resp 18   Ht 5' 5.5\" (1.664 m)   Wt 197 lb (89.4 kg)   SpO2 97%   BMI 32.28 kg/m²       Physical Exam:  Neurological: motor strength: 5/5 in lower extremities bilaterally                          sensation: intact to light touch  Patient mobility                         Intake and Output:  Current Shift:  No intake/output data recorded. Last three shifts:  06/20 1901 - 06/22 0700  In: 509 [P.O.:150; I.V.:359]  Out: 1610 [Urine:1600; Drains:10]    Lab/Data Reviewed:    Lab/Data Reviewed:  Lab Results   Component Value Date/Time    HGB 6.4 06/21/2023 05:36 AM    HCT 20.9 06/21/2023 05:36 AM     No results found for: APTT  No results found for: INR, PTMR, PT1         Assessment/Plan     Active Problems:    * No active hospital problems. *  Resolved Problems:    * No resolved hospital problems. *      1. Stable  2. OOB with PT  3. D/C Planning to SNF  4. Follow-up in 10 days at [facility] with Dr. Sirena Grace.     Rolling walker and lumbar support brace were both delivered to wife on day of surgery/ TYocum

## 2023-06-22 NOTE — PROGRESS NOTES
D/c plan: [address] today at 1330 via stretcher. 11:50: [address] has received insurance auth. CM met w/ pt at bedside to inform him that [facility] has been obtained. CM and pt called his wife together. CM requested pt's spouse bring his Maribavir to [facility]. She states she will do so. CM advised her that the staff will call her when he gets to [facility] so she can bring it there. She verbalized an understanding. CMS notified Chris Davies in admissions at [facility] to have staff call spouse to bring the medication. Pt will d/c via stretcher transport. 1024: CMS spoke w/ Chris Davies in admissions at [facility]. He will initiate insurance auth. CM met w/ pt at beside. Informed him that [facility] [address], [facility] and [facility] have accepted him. Pt has chosen [facility]. CMS to reach out to [facility] to have them initiate insurance auth. Transition of care to SNF: [facility]    Communication to Patient/Family:  Met with patient and family, and they are agreeable to the transition plan. The Plan for Transition of Care is related to the following treatment goals: SNF     The Patient was provided with a choice of provider and agrees   with the discharge plan. Yes [x] No []    Freedom of choice list was provided with basic dialogue that supports the patient's individualized plan of care/goals and shares the quality data associated with the providers. Yes [x] No []    SNF/Transition:  Patient has been accepted to [facility] and Silvano Vazquez [address] [place] [address], and meets criteria for admission. Patient will transported by [facility] and expected to leave at 1330. Communication to SNF/:  Bedside RN, Karol Eisenmenger, has been notified to update the transition plan to the facility and call report [phone]    Discharge information has been updated on the AVS and sent via [employer] and/or CC link.     Discharge instructions to be

## 2023-06-22 NOTE — PROGRESS NOTES
Medicare Outpatient Observation Notice (MOON)/ [place] Outpatient Observation Notice (Cristiane Waddell) provided to patient/representative with verbal explanation of the notice. Time allotted for questions regarding the notice. Patient /representative provided a completed copy of the MOON/LUDY notice. Copy placed on bedside chart.

## 2023-06-22 NOTE — PROGRESS NOTES
Progress Summary:  Assumed care of patient in bed awake and c/o pain. Medicated x 2 for pain with some relief noted. VSS,  Uneventful night, remains in isolation, call bell within reach.

## 2023-07-07 ENCOUNTER — APPOINTMENT (OUTPATIENT)
Facility: HOSPITAL | Age: 63
End: 2023-07-07
Attending: ORTHOPAEDIC SURGERY
Payer: MEDICARE

## 2023-07-07 ENCOUNTER — HOSPITAL ENCOUNTER (OUTPATIENT)
Facility: HOSPITAL | Age: 63
Setting detail: OBSERVATION
Discharge: ANOTHER HOSPITAL | End: 2023-07-09
Attending: ORTHOPAEDIC SURGERY | Admitting: ORTHOPAEDIC SURGERY
Payer: MEDICARE

## 2023-07-07 DIAGNOSIS — M43.16 SPONDYLOLISTHESIS OF LUMBAR REGION: ICD-10-CM

## 2023-07-07 DIAGNOSIS — G89.18 POST-OP PAIN: Primary | ICD-10-CM

## 2023-07-07 PROBLEM — Z86.73 HISTORY OF STROKE: Status: ACTIVE | Noted: 2023-07-07

## 2023-07-07 PROBLEM — I10 HTN (HYPERTENSION): Status: ACTIVE | Noted: 2023-07-07

## 2023-07-07 LAB
GLUCOSE BLD STRIP.AUTO-MCNC: 133 MG/DL (ref 70–110)
GLUCOSE BLD STRIP.AUTO-MCNC: 135 MG/DL (ref 70–110)
GLUCOSE BLD STRIP.AUTO-MCNC: 138 MG/DL (ref 70–110)
GLUCOSE BLD STRIP.AUTO-MCNC: 143 MG/DL (ref 70–110)

## 2023-07-07 PROCEDURE — G0378 HOSPITAL OBSERVATION PER HR: HCPCS

## 2023-07-07 PROCEDURE — 87040 BLOOD CULTURE FOR BACTERIA: CPT

## 2023-07-07 PROCEDURE — 97116 GAIT TRAINING THERAPY: CPT

## 2023-07-07 PROCEDURE — 6360000002 HC RX W HCPCS: Performed by: ORTHOPAEDIC SURGERY

## 2023-07-07 PROCEDURE — 87070 CULTURE OTHR SPECIMN AEROBIC: CPT

## 2023-07-07 PROCEDURE — 97162 PT EVAL MOD COMPLEX 30 MIN: CPT

## 2023-07-07 PROCEDURE — 6360000002 HC RX W HCPCS: Performed by: INTERNAL MEDICINE

## 2023-07-07 PROCEDURE — 6370000000 HC RX 637 (ALT 250 FOR IP): Performed by: ORTHOPAEDIC SURGERY

## 2023-07-07 PROCEDURE — 87205 SMEAR GRAM STAIN: CPT

## 2023-07-07 PROCEDURE — G0379 DIRECT REFER HOSPITAL OBSERV: HCPCS

## 2023-07-07 PROCEDURE — 82962 GLUCOSE BLOOD TEST: CPT

## 2023-07-07 PROCEDURE — 70450 CT HEAD/BRAIN W/O DYE: CPT

## 2023-07-07 PROCEDURE — 83036 HEMOGLOBIN GLYCOSYLATED A1C: CPT

## 2023-07-07 PROCEDURE — 97530 THERAPEUTIC ACTIVITIES: CPT

## 2023-07-07 PROCEDURE — 6370000000 HC RX 637 (ALT 250 FOR IP): Performed by: INTERNAL MEDICINE

## 2023-07-07 PROCEDURE — 36415 COLL VENOUS BLD VENIPUNCTURE: CPT

## 2023-07-07 PROCEDURE — 96365 THER/PROPH/DIAG IV INF INIT: CPT

## 2023-07-07 PROCEDURE — 2580000003 HC RX 258: Performed by: INTERNAL MEDICINE

## 2023-07-07 RX ORDER — DEXTROSE MONOHYDRATE 100 MG/ML
INJECTION, SOLUTION INTRAVENOUS CONTINUOUS PRN
Status: DISCONTINUED | OUTPATIENT
Start: 2023-07-07 | End: 2023-07-10 | Stop reason: HOSPADM

## 2023-07-07 RX ORDER — LOSARTAN POTASSIUM 50 MG/1
50 TABLET ORAL DAILY
Status: DISCONTINUED | OUTPATIENT
Start: 2023-07-07 | End: 2023-07-10 | Stop reason: HOSPADM

## 2023-07-07 RX ORDER — VITAMIN E 268 MG
800 CAPSULE ORAL DAILY
Status: DISCONTINUED | OUTPATIENT
Start: 2023-07-07 | End: 2023-07-10 | Stop reason: HOSPADM

## 2023-07-07 RX ORDER — TAMSULOSIN HYDROCHLORIDE 0.4 MG/1
0.8 CAPSULE ORAL NIGHTLY
Status: DISCONTINUED | OUTPATIENT
Start: 2023-07-07 | End: 2023-07-10 | Stop reason: HOSPADM

## 2023-07-07 RX ORDER — ROSUVASTATIN CALCIUM 10 MG/1
10 TABLET, COATED ORAL NIGHTLY
Status: DISCONTINUED | OUTPATIENT
Start: 2023-07-07 | End: 2023-07-10 | Stop reason: HOSPADM

## 2023-07-07 RX ORDER — CARVEDILOL 3.12 MG/1
6.25 TABLET ORAL 2 TIMES DAILY
Status: DISCONTINUED | OUTPATIENT
Start: 2023-07-07 | End: 2023-07-10 | Stop reason: HOSPADM

## 2023-07-07 RX ORDER — INSULIN LISPRO 100 [IU]/ML
0-8 INJECTION, SOLUTION INTRAVENOUS; SUBCUTANEOUS
Status: DISCONTINUED | OUTPATIENT
Start: 2023-07-07 | End: 2023-07-10 | Stop reason: HOSPADM

## 2023-07-07 RX ORDER — MYCOPHENOLATE MOFETIL 250 MG/1
1000 CAPSULE ORAL 2 TIMES DAILY
Status: DISCONTINUED | OUTPATIENT
Start: 2023-07-07 | End: 2023-07-10 | Stop reason: HOSPADM

## 2023-07-07 RX ORDER — OXYCODONE HYDROCHLORIDE 5 MG/1
10 TABLET ORAL EVERY 4 HOURS PRN
Status: DISCONTINUED | OUTPATIENT
Start: 2023-07-07 | End: 2023-07-10 | Stop reason: HOSPADM

## 2023-07-07 RX ORDER — MULTIVIT-MIN/FERROUS GLUCONATE 9 MG/15 ML
15 LIQUID (ML) ORAL DAILY
COMMUNITY

## 2023-07-07 RX ORDER — ASPIRIN 81 MG/1
81 TABLET, CHEWABLE ORAL DAILY
Status: DISCONTINUED | OUTPATIENT
Start: 2023-07-07 | End: 2023-07-10 | Stop reason: HOSPADM

## 2023-07-07 RX ORDER — AMLODIPINE BESYLATE 5 MG/1
5 TABLET ORAL DAILY
Status: DISCONTINUED | OUTPATIENT
Start: 2023-07-07 | End: 2023-07-10 | Stop reason: HOSPADM

## 2023-07-07 RX ORDER — MULTIVIT-MIN/FERROUS GLUCONATE 9 MG/15 ML
15 LIQUID (ML) ORAL DAILY
Status: DISCONTINUED | OUTPATIENT
Start: 2023-07-07 | End: 2023-07-08 | Stop reason: ALTCHOICE

## 2023-07-07 RX ORDER — NALOXONE HYDROCHLORIDE 4 MG/.1ML
1 SPRAY NASAL PRN
Status: DISCONTINUED | OUTPATIENT
Start: 2023-07-07 | End: 2023-07-07 | Stop reason: CLARIF

## 2023-07-07 RX ORDER — TADALAFIL 2.5 MG/1
2.5 TABLET ORAL DAILY
Status: DISCONTINUED | OUTPATIENT
Start: 2023-07-07 | End: 2023-07-10 | Stop reason: HOSPADM

## 2023-07-07 RX ORDER — INSULIN LISPRO 100 [IU]/ML
0-4 INJECTION, SOLUTION INTRAVENOUS; SUBCUTANEOUS NIGHTLY
Status: DISCONTINUED | OUTPATIENT
Start: 2023-07-07 | End: 2023-07-10 | Stop reason: HOSPADM

## 2023-07-07 RX ORDER — OXYCODONE HCL 10 MG/1
10 TABLET, FILM COATED, EXTENDED RELEASE ORAL EVERY 12 HOURS
Status: DISCONTINUED | OUTPATIENT
Start: 2023-07-07 | End: 2023-07-10 | Stop reason: HOSPADM

## 2023-07-07 RX ORDER — FLUCONAZOLE 100 MG/1
200 TABLET ORAL DAILY
Status: DISCONTINUED | OUTPATIENT
Start: 2023-07-07 | End: 2023-07-10 | Stop reason: HOSPADM

## 2023-07-07 RX ORDER — OXYCODONE HYDROCHLORIDE 10 MG/1
10 TABLET ORAL EVERY 4 HOURS PRN
Qty: 42 TABLET | Refills: 0 | Status: SHIPPED | OUTPATIENT
Start: 2023-07-07 | End: 2023-07-14

## 2023-07-07 RX ADMIN — ASPIRIN 81 MG: 81 TABLET, CHEWABLE ORAL at 08:41

## 2023-07-07 RX ADMIN — ROSUVASTATIN CALCIUM 10 MG: 10 TABLET, COATED ORAL at 21:29

## 2023-07-07 RX ADMIN — OXYCODONE HYDROCHLORIDE 10 MG: 10 TABLET, FILM COATED, EXTENDED RELEASE ORAL at 21:37

## 2023-07-07 RX ADMIN — OXYCODONE HYDROCHLORIDE 10 MG: 10 TABLET, FILM COATED, EXTENDED RELEASE ORAL at 08:40

## 2023-07-07 RX ADMIN — MYCOPHENOLATE MOFETIL 1000 MG: 250 CAPSULE ORAL at 08:40

## 2023-07-07 RX ADMIN — OXYCODONE HYDROCHLORIDE 10 MG: 5 TABLET ORAL at 19:18

## 2023-07-07 RX ADMIN — OXYCODONE HYDROCHLORIDE 10 MG: 5 TABLET ORAL at 04:27

## 2023-07-07 RX ADMIN — CEFEPIME 2000 MG: 2 INJECTION, POWDER, FOR SOLUTION INTRAVENOUS at 22:17

## 2023-07-07 RX ADMIN — TAMSULOSIN HYDROCHLORIDE 0.8 MG: 0.4 CAPSULE ORAL at 21:29

## 2023-07-07 RX ADMIN — MYCOPHENOLATE MOFETIL 1000 MG: 250 CAPSULE ORAL at 21:28

## 2023-07-07 RX ADMIN — AMLODIPINE BESYLATE 5 MG: 5 TABLET ORAL at 08:40

## 2023-07-07 RX ADMIN — CARVEDILOL 6.25 MG: 3.12 TABLET, FILM COATED ORAL at 08:40

## 2023-07-07 RX ADMIN — CARVEDILOL 6.25 MG: 3.12 TABLET, FILM COATED ORAL at 21:30

## 2023-07-07 RX ADMIN — LOSARTAN POTASSIUM 50 MG: 50 TABLET, FILM COATED ORAL at 08:40

## 2023-07-07 RX ADMIN — FLUCONAZOLE 200 MG: 100 TABLET ORAL at 22:18

## 2023-07-07 RX ADMIN — VANCOMYCIN HYDROCHLORIDE 2000 MG: 10 INJECTION, POWDER, LYOPHILIZED, FOR SOLUTION INTRAVENOUS at 22:17

## 2023-07-07 RX ADMIN — OXYCODONE HYDROCHLORIDE 10 MG: 5 TABLET ORAL at 08:41

## 2023-07-07 ASSESSMENT — PAIN DESCRIPTION - LOCATION
LOCATION: BACK;LEG
LOCATION: LEG
LOCATION: BACK

## 2023-07-07 ASSESSMENT — PAIN SCALES - GENERAL
PAINLEVEL_OUTOF10: 10
PAINLEVEL_OUTOF10: 7
PAINLEVEL_OUTOF10: 9

## 2023-07-07 ASSESSMENT — PAIN DESCRIPTION - DESCRIPTORS
DESCRIPTORS: ACHING
DESCRIPTORS: ACHING;SHARP;THROBBING

## 2023-07-07 ASSESSMENT — PAIN DESCRIPTION - ORIENTATION
ORIENTATION: RIGHT;LEFT
ORIENTATION: RIGHT

## 2023-07-08 LAB
ALBUMIN SERPL-MCNC: 2.8 G/DL (ref 3.4–5)
ALBUMIN/GLOB SERPL: 1 (ref 0.8–1.7)
ALP SERPL-CCNC: 120 U/L (ref 45–117)
ALT SERPL-CCNC: 10 U/L (ref 16–61)
ANION GAP SERPL CALC-SCNC: 4 MMOL/L (ref 3–18)
AST SERPL-CCNC: 10 U/L (ref 10–38)
BILIRUB SERPL-MCNC: 0.5 MG/DL (ref 0.2–1)
BUN SERPL-MCNC: 24 MG/DL (ref 7–18)
BUN/CREAT SERPL: 15 (ref 12–20)
CALCIUM SERPL-MCNC: 8.5 MG/DL (ref 8.5–10.1)
CHLORIDE SERPL-SCNC: 115 MMOL/L (ref 100–111)
CO2 SERPL-SCNC: 23 MMOL/L (ref 21–32)
CREAT SERPL-MCNC: 1.55 MG/DL (ref 0.6–1.3)
ERYTHROCYTE [DISTWIDTH] IN BLOOD BY AUTOMATED COUNT: 15.9 % (ref 11.6–14.5)
EST. AVERAGE GLUCOSE BLD GHB EST-MCNC: 108 MG/DL
GLOBULIN SER CALC-MCNC: 2.8 G/DL (ref 2–4)
GLUCOSE BLD STRIP.AUTO-MCNC: 112 MG/DL (ref 70–110)
GLUCOSE BLD STRIP.AUTO-MCNC: 119 MG/DL (ref 70–110)
GLUCOSE BLD STRIP.AUTO-MCNC: 128 MG/DL (ref 70–110)
GLUCOSE BLD STRIP.AUTO-MCNC: 144 MG/DL (ref 70–110)
GLUCOSE BLD STRIP.AUTO-MCNC: 187 MG/DL (ref 70–110)
GLUCOSE SERPL-MCNC: 116 MG/DL (ref 74–99)
HBA1C MFR BLD: 5.4 % (ref 4.2–5.6)
HCT VFR BLD AUTO: 26.1 % (ref 36–48)
HGB BLD-MCNC: 7.9 G/DL (ref 13–16)
MCH RBC QN AUTO: 27.5 PG (ref 24–34)
MCHC RBC AUTO-ENTMCNC: 30.3 G/DL (ref 31–37)
MCV RBC AUTO: 90.9 FL (ref 78–100)
NRBC # BLD: 0 K/UL (ref 0–0.01)
NRBC BLD-RTO: 0 PER 100 WBC
PLATELET # BLD AUTO: 245 K/UL (ref 135–420)
PMV BLD AUTO: 10.9 FL (ref 9.2–11.8)
POTASSIUM SERPL-SCNC: 4.2 MMOL/L (ref 3.5–5.5)
PROT SERPL-MCNC: 5.6 G/DL (ref 6.4–8.2)
RBC # BLD AUTO: 2.87 M/UL (ref 4.35–5.65)
SODIUM SERPL-SCNC: 142 MMOL/L (ref 136–145)
VANCOMYCIN SERPL-MCNC: 22.7 UG/ML (ref 5–40)
WBC # BLD AUTO: 3 K/UL (ref 4.6–13.2)

## 2023-07-08 PROCEDURE — 80053 COMPREHEN METABOLIC PANEL: CPT

## 2023-07-08 PROCEDURE — 2580000003 HC RX 258: Performed by: INTERNAL MEDICINE

## 2023-07-08 PROCEDURE — 6370000000 HC RX 637 (ALT 250 FOR IP): Performed by: INTERNAL MEDICINE

## 2023-07-08 PROCEDURE — 6360000002 HC RX W HCPCS: Performed by: ORTHOPAEDIC SURGERY

## 2023-07-08 PROCEDURE — 96367 TX/PROPH/DG ADDL SEQ IV INF: CPT

## 2023-07-08 PROCEDURE — 6370000000 HC RX 637 (ALT 250 FOR IP): Performed by: ORTHOPAEDIC SURGERY

## 2023-07-08 PROCEDURE — G0378 HOSPITAL OBSERVATION PER HR: HCPCS

## 2023-07-08 PROCEDURE — 6360000002 HC RX W HCPCS: Performed by: INTERNAL MEDICINE

## 2023-07-08 PROCEDURE — 96366 THER/PROPH/DIAG IV INF ADDON: CPT

## 2023-07-08 PROCEDURE — 85027 COMPLETE CBC AUTOMATED: CPT

## 2023-07-08 PROCEDURE — 80202 ASSAY OF VANCOMYCIN: CPT

## 2023-07-08 RX ORDER — M-VIT,TX,IRON,MINS/CALC/FOLIC 27MG-0.4MG
1 TABLET ORAL DAILY
Status: DISCONTINUED | OUTPATIENT
Start: 2023-07-08 | End: 2023-07-10 | Stop reason: HOSPADM

## 2023-07-08 RX ORDER — AMLODIPINE BESYLATE 5 MG/1
5 TABLET ORAL ONCE
Status: COMPLETED | OUTPATIENT
Start: 2023-07-08 | End: 2023-07-08

## 2023-07-08 RX ADMIN — ROSUVASTATIN CALCIUM 10 MG: 10 TABLET, COATED ORAL at 21:55

## 2023-07-08 RX ADMIN — GANCICLOVIR SODIUM 155 MG: 50 INJECTION, SOLUTION INTRAVENOUS at 01:43

## 2023-07-08 RX ADMIN — LOSARTAN POTASSIUM 50 MG: 50 TABLET, FILM COATED ORAL at 10:04

## 2023-07-08 RX ADMIN — OXYCODONE HYDROCHLORIDE 10 MG: 10 TABLET, FILM COATED, EXTENDED RELEASE ORAL at 08:13

## 2023-07-08 RX ADMIN — CARVEDILOL 6.25 MG: 3.12 TABLET, FILM COATED ORAL at 10:04

## 2023-07-08 RX ADMIN — AMLODIPINE BESYLATE 5 MG: 5 TABLET ORAL at 05:24

## 2023-07-08 RX ADMIN — GANCICLOVIR SODIUM 155 MG: 50 INJECTION, SOLUTION INTRAVENOUS at 14:53

## 2023-07-08 RX ADMIN — CARVEDILOL 6.25 MG: 3.12 TABLET, FILM COATED ORAL at 21:55

## 2023-07-08 RX ADMIN — MYCOPHENOLATE MOFETIL 1000 MG: 250 CAPSULE ORAL at 21:53

## 2023-07-08 RX ADMIN — VANCOMYCIN HYDROCHLORIDE 1250 MG: 10 INJECTION, POWDER, LYOPHILIZED, FOR SOLUTION INTRAVENOUS at 22:42

## 2023-07-08 RX ADMIN — ASPIRIN 81 MG: 81 TABLET, CHEWABLE ORAL at 10:04

## 2023-07-08 RX ADMIN — OXYCODONE HYDROCHLORIDE 10 MG: 5 TABLET ORAL at 01:57

## 2023-07-08 RX ADMIN — CEFEPIME 2000 MG: 2 INJECTION, POWDER, FOR SOLUTION INTRAVENOUS at 10:05

## 2023-07-08 RX ADMIN — AMLODIPINE BESYLATE 5 MG: 5 TABLET ORAL at 10:04

## 2023-07-08 RX ADMIN — OXYCODONE HYDROCHLORIDE 10 MG: 10 TABLET, FILM COATED, EXTENDED RELEASE ORAL at 21:52

## 2023-07-08 RX ADMIN — TAMSULOSIN HYDROCHLORIDE 0.8 MG: 0.4 CAPSULE ORAL at 21:54

## 2023-07-08 RX ADMIN — CEFEPIME 2000 MG: 2 INJECTION, POWDER, FOR SOLUTION INTRAVENOUS at 21:58

## 2023-07-08 RX ADMIN — MULTIPLE VITAMINS W/ MINERALS TAB 1 TABLET: TAB at 14:54

## 2023-07-08 RX ADMIN — OXYCODONE HYDROCHLORIDE 10 MG: 5 TABLET ORAL at 05:24

## 2023-07-08 RX ADMIN — MYCOPHENOLATE MOFETIL 1000 MG: 250 CAPSULE ORAL at 10:04

## 2023-07-08 RX ADMIN — Medication 800 UNITS: at 10:05

## 2023-07-08 RX ADMIN — FLUCONAZOLE 200 MG: 100 TABLET ORAL at 10:04

## 2023-07-08 ASSESSMENT — PAIN SCALES - GENERAL
PAINLEVEL_OUTOF10: 0
PAINLEVEL_OUTOF10: 8

## 2023-07-08 ASSESSMENT — PAIN DESCRIPTION - LOCATION: LOCATION: BACK

## 2023-07-08 ASSESSMENT — PAIN DESCRIPTION - DESCRIPTORS: DESCRIPTORS: THROBBING

## 2023-07-08 ASSESSMENT — PAIN DESCRIPTION - ORIENTATION: ORIENTATION: POSTERIOR

## 2023-07-09 VITALS
HEIGHT: 65 IN | BODY MASS INDEX: 33.43 KG/M2 | DIASTOLIC BLOOD PRESSURE: 75 MMHG | RESPIRATION RATE: 20 BRPM | WEIGHT: 200.62 LBS | HEART RATE: 78 BPM | SYSTOLIC BLOOD PRESSURE: 173 MMHG | TEMPERATURE: 98.6 F | OXYGEN SATURATION: 100 %

## 2023-07-09 LAB
ALBUMIN SERPL-MCNC: 2.4 G/DL (ref 3.4–5)
ALBUMIN/GLOB SERPL: 0.8 (ref 0.8–1.7)
ALP SERPL-CCNC: 104 U/L (ref 45–117)
ALT SERPL-CCNC: 8 U/L (ref 16–61)
ANION GAP SERPL CALC-SCNC: 6 MMOL/L (ref 3–18)
AST SERPL-CCNC: 10 U/L (ref 10–38)
BILIRUB SERPL-MCNC: 0.5 MG/DL (ref 0.2–1)
BUN SERPL-MCNC: 25 MG/DL (ref 7–18)
BUN/CREAT SERPL: 15 (ref 12–20)
CALCIUM SERPL-MCNC: 8.4 MG/DL (ref 8.5–10.1)
CHLORIDE SERPL-SCNC: 115 MMOL/L (ref 100–111)
CO2 SERPL-SCNC: 20 MMOL/L (ref 21–32)
CREAT SERPL-MCNC: 1.66 MG/DL (ref 0.6–1.3)
ERYTHROCYTE [DISTWIDTH] IN BLOOD BY AUTOMATED COUNT: 15.8 % (ref 11.6–14.5)
GLOBULIN SER CALC-MCNC: 3 G/DL (ref 2–4)
GLUCOSE BLD STRIP.AUTO-MCNC: 103 MG/DL (ref 70–110)
GLUCOSE BLD STRIP.AUTO-MCNC: 105 MG/DL (ref 70–110)
GLUCOSE BLD STRIP.AUTO-MCNC: 124 MG/DL (ref 70–110)
GLUCOSE BLD STRIP.AUTO-MCNC: 125 MG/DL (ref 70–110)
GLUCOSE SERPL-MCNC: 107 MG/DL (ref 74–99)
HCT VFR BLD AUTO: 25.5 % (ref 36–48)
HGB BLD-MCNC: 7.6 G/DL (ref 13–16)
MCH RBC QN AUTO: 27 PG (ref 24–34)
MCHC RBC AUTO-ENTMCNC: 29.8 G/DL (ref 31–37)
MCV RBC AUTO: 90.4 FL (ref 78–100)
NRBC # BLD: 0 K/UL (ref 0–0.01)
NRBC BLD-RTO: 0 PER 100 WBC
PLATELET # BLD AUTO: 212 K/UL (ref 135–420)
PMV BLD AUTO: 10.8 FL (ref 9.2–11.8)
POTASSIUM SERPL-SCNC: 4.3 MMOL/L (ref 3.5–5.5)
PROT SERPL-MCNC: 5.4 G/DL (ref 6.4–8.2)
RBC # BLD AUTO: 2.82 M/UL (ref 4.35–5.65)
SODIUM SERPL-SCNC: 141 MMOL/L (ref 136–145)
VANCOMYCIN SERPL-MCNC: 32 UG/ML (ref 5–40)
WBC # BLD AUTO: 3 K/UL (ref 4.6–13.2)

## 2023-07-09 PROCEDURE — 2580000003 HC RX 258: Performed by: INTERNAL MEDICINE

## 2023-07-09 PROCEDURE — 6360000002 HC RX W HCPCS: Performed by: INTERNAL MEDICINE

## 2023-07-09 PROCEDURE — 85027 COMPLETE CBC AUTOMATED: CPT

## 2023-07-09 PROCEDURE — 82962 GLUCOSE BLOOD TEST: CPT

## 2023-07-09 PROCEDURE — 6360000002 HC RX W HCPCS: Performed by: ORTHOPAEDIC SURGERY

## 2023-07-09 PROCEDURE — 6370000000 HC RX 637 (ALT 250 FOR IP): Performed by: ORTHOPAEDIC SURGERY

## 2023-07-09 PROCEDURE — 36415 COLL VENOUS BLD VENIPUNCTURE: CPT

## 2023-07-09 PROCEDURE — 80202 ASSAY OF VANCOMYCIN: CPT

## 2023-07-09 PROCEDURE — 80053 COMPREHEN METABOLIC PANEL: CPT

## 2023-07-09 PROCEDURE — 96366 THER/PROPH/DIAG IV INF ADDON: CPT

## 2023-07-09 PROCEDURE — G0378 HOSPITAL OBSERVATION PER HR: HCPCS

## 2023-07-09 PROCEDURE — 6370000000 HC RX 637 (ALT 250 FOR IP): Performed by: INTERNAL MEDICINE

## 2023-07-09 RX ORDER — ALOGLIPTIN 6.25 MG/1
6.25 TABLET, FILM COATED ORAL DAILY
Status: DISCONTINUED | OUTPATIENT
Start: 2023-07-09 | End: 2023-07-10 | Stop reason: HOSPADM

## 2023-07-09 RX ADMIN — CARVEDILOL 6.25 MG: 3.12 TABLET, FILM COATED ORAL at 21:15

## 2023-07-09 RX ADMIN — MULTIPLE VITAMINS W/ MINERALS TAB 1 TABLET: TAB at 09:05

## 2023-07-09 RX ADMIN — LOSARTAN POTASSIUM 50 MG: 50 TABLET, FILM COATED ORAL at 09:04

## 2023-07-09 RX ADMIN — ROSUVASTATIN CALCIUM 10 MG: 10 TABLET, COATED ORAL at 21:15

## 2023-07-09 RX ADMIN — CEFEPIME 2000 MG: 2 INJECTION, POWDER, FOR SOLUTION INTRAVENOUS at 09:05

## 2023-07-09 RX ADMIN — GANCICLOVIR SODIUM 155 MG: 50 INJECTION, SOLUTION INTRAVENOUS at 01:36

## 2023-07-09 RX ADMIN — MYCOPHENOLATE MOFETIL 1000 MG: 250 CAPSULE ORAL at 09:04

## 2023-07-09 RX ADMIN — VANCOMYCIN HYDROCHLORIDE 750 MG: 750 INJECTION, POWDER, LYOPHILIZED, FOR SOLUTION INTRAVENOUS at 21:25

## 2023-07-09 RX ADMIN — ALOGLIPTIN 6.25 MG: 6.25 TABLET, FILM COATED ORAL at 18:01

## 2023-07-09 RX ADMIN — FLUCONAZOLE 200 MG: 100 TABLET ORAL at 09:05

## 2023-07-09 RX ADMIN — OXYCODONE HYDROCHLORIDE 10 MG: 5 TABLET ORAL at 14:06

## 2023-07-09 RX ADMIN — GANCICLOVIR SODIUM 155 MG: 50 INJECTION, SOLUTION INTRAVENOUS at 16:09

## 2023-07-09 RX ADMIN — Medication 800 UNITS: at 09:26

## 2023-07-09 RX ADMIN — CEFEPIME 2000 MG: 2 INJECTION, POWDER, FOR SOLUTION INTRAVENOUS at 21:23

## 2023-07-09 RX ADMIN — ASPIRIN 81 MG: 81 TABLET, CHEWABLE ORAL at 09:05

## 2023-07-09 RX ADMIN — OXYCODONE HYDROCHLORIDE 10 MG: 10 TABLET, FILM COATED, EXTENDED RELEASE ORAL at 21:15

## 2023-07-09 RX ADMIN — AMLODIPINE BESYLATE 5 MG: 5 TABLET ORAL at 09:04

## 2023-07-09 RX ADMIN — CARVEDILOL 6.25 MG: 3.12 TABLET, FILM COATED ORAL at 09:04

## 2023-07-09 RX ADMIN — MYCOPHENOLATE MOFETIL 1000 MG: 250 CAPSULE ORAL at 21:15

## 2023-07-09 RX ADMIN — OXYCODONE HYDROCHLORIDE 10 MG: 5 TABLET ORAL at 04:11

## 2023-07-09 RX ADMIN — TAMSULOSIN HYDROCHLORIDE 0.8 MG: 0.4 CAPSULE ORAL at 21:16

## 2023-07-09 RX ADMIN — OXYCODONE HYDROCHLORIDE 10 MG: 10 TABLET, FILM COATED, EXTENDED RELEASE ORAL at 09:04

## 2023-07-09 ASSESSMENT — PAIN DESCRIPTION - ORIENTATION
ORIENTATION: LOWER
ORIENTATION: RIGHT
ORIENTATION: RIGHT

## 2023-07-09 ASSESSMENT — PAIN SCALES - GENERAL
PAINLEVEL_OUTOF10: 8
PAINLEVEL_OUTOF10: 6
PAINLEVEL_OUTOF10: 8
PAINLEVEL_OUTOF10: 8

## 2023-07-09 ASSESSMENT — PAIN DESCRIPTION - LOCATION
LOCATION: BACK
LOCATION: LEG;BACK;ABDOMEN
LOCATION: BACK

## 2023-07-09 ASSESSMENT — PAIN DESCRIPTION - DESCRIPTORS
DESCRIPTORS: ACHING
DESCRIPTORS: THROBBING
DESCRIPTORS: ACHING

## 2023-07-09 ASSESSMENT — PAIN DESCRIPTION - PAIN TYPE: TYPE: SURGICAL PAIN

## 2023-07-10 NOTE — PROGRESS NOTES
Received care of pt from [place], [place]. Advised that [facility] would be picking pt up at midnight to transfer to [facility].  However, transport arrived early and report was called to Iggy Carrington RN on [address] at [phone]. Pt going to room 924. VSS, IV left in place - (pt is limb alert d/t fistula and had an US guided IV in place). Glucose taken and was normal, no coverage needed. Oral medications given and IV abx sent with pt (per Nursing Supervisor, Malone Fabry).

## 2023-07-11 LAB
BACTERIA SPEC CULT: NORMAL
GRAM STN SPEC: NORMAL
GRAM STN SPEC: NORMAL
SERVICE CMNT-IMP: NORMAL

## 2023-07-14 LAB
BACTERIA SPEC CULT: NORMAL
BACTERIA SPEC CULT: NORMAL
SERVICE CMNT-IMP: NORMAL
SERVICE CMNT-IMP: NORMAL

## 2025-02-14 ENCOUNTER — HOSPITAL ENCOUNTER (OUTPATIENT)
Facility: HOSPITAL | Age: 65
Discharge: HOME | End: 2025-02-17
Payer: MEDICARE

## 2025-02-14 ENCOUNTER — HOSPITAL ENCOUNTER (OUTPATIENT)
Facility: HOSPITAL | Age: 65
Setting detail: SPECIMEN
Discharge: HOME | End: 2025-02-17
Payer: MEDICARE

## 2025-02-14 DIAGNOSIS — Z01.818 PRE-OP TESTING: ICD-10-CM

## 2025-02-14 LAB — LABCORP SPECIMEN COLLECTION: NORMAL

## 2025-02-14 PROCEDURE — 71046 X-RAY EXAM CHEST 2 VIEWS: CPT

## 2025-02-14 PROCEDURE — 93005 ELECTROCARDIOGRAM TRACING: CPT | Performed by: UROLOGY

## 2025-02-14 PROCEDURE — 99001 SPECIMEN HANDLING PT-LAB: CPT

## 2025-02-16 LAB
EKG ATRIAL RATE: 75 BPM
EKG DIAGNOSIS: NORMAL
EKG Q-T INTERVAL: 516 MS
EKG QRS DURATION: 202 MS
EKG QTC CALCULATION (BAZETT): 540 MS
EKG R AXIS: 268 DEGREES
EKG T AXIS: 80 DEGREES
EKG VENTRICULAR RATE: 66 BPM

## 2025-02-16 PROCEDURE — 93010 ELECTROCARDIOGRAM REPORT: CPT | Performed by: INTERNAL MEDICINE

## 2025-04-04 ENCOUNTER — ANESTHESIA EVENT (OUTPATIENT)
Facility: HOSPITAL | Age: 65
End: 2025-04-04
Payer: MEDICARE

## 2025-04-09 NOTE — H&P
[facility] [place] [facility]  [address]  [facility]ospitals in [place] [address]  Tel:  [phone]  Fax: [phone]    Patient : Andrea Ely   YOB: 1960   Birth Sex: Male      Date:  04/03/2025 11:20 AM    Visit Type:   Office Visit   Assessment/Plan  # Detail Type Description    1. 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    Patient Plan Pt cont to have urinary problems    Scheduled for [facility] 04/10/2025  04/03/2025 watch surgery video  Postop medicines cephalexin and tramadol prescribed  All questions asked and answered to his satisfaction  He has stopped taking aspirin and Plavix  Does not take GLP 1 medications    Urine sent for culture for preop      Risks pain infection bleeding small chance of retrograde ejaculation need for catheter for several days reviewed he understands will proceed.    Return as per Dr. Rios recommends         2. Assessment Urge incontinence (N39.41).    Patient Plan He has worsening of his urinary symptoms. Discussed OAB medication for his irritative voiding symptoms but he declines.         3. Assessment Feeling of incomplete bladder emptying (R39.14).         4. Assessment Other male erectile dysfunction (N52.8).         5. Other Orders Orders not associated to today's assessments.    Plan Orders Urine Culture, Routine to be performed. Obtained on 04/03/2025.              Additional Visit Information    This 64 year old patient presents for Benign prostatic hyperplasia and erectile dysfunction uro.    History of Present Illness  1.  Benign prostatic hyperplasia   Patient has a documented IPSS Score of 25 and a Bother Score of 5.           Comments: 11/14/2023 - Patient reports continued weak stream, frequency, nocturia x 3-4 and urge incontinence. He is on Flomax 0.8 mg daily and Tadalafil 5 mg daily. Discussed proceeding with a surigcal procedure for OBRIEN but he declines. Will trial Gemtesa for his urge incontinence and nocturia. Samples

## 2025-04-10 ENCOUNTER — ANESTHESIA (OUTPATIENT)
Facility: HOSPITAL | Age: 65
End: 2025-04-10
Payer: MEDICARE

## 2025-04-10 ENCOUNTER — HOSPITAL ENCOUNTER (OUTPATIENT)
Facility: HOSPITAL | Age: 65
Setting detail: OUTPATIENT SURGERY
Discharge: HOME | End: 2025-04-10
Attending: UROLOGY | Admitting: UROLOGY
Payer: MEDICARE

## 2025-04-10 VITALS
OXYGEN SATURATION: 100 % | HEIGHT: 66 IN | WEIGHT: 207.5 LBS | BODY MASS INDEX: 33.35 KG/M2 | DIASTOLIC BLOOD PRESSURE: 75 MMHG | SYSTOLIC BLOOD PRESSURE: 162 MMHG | RESPIRATION RATE: 16 BRPM | TEMPERATURE: 97.6 F | HEART RATE: 63 BPM

## 2025-04-10 LAB
GLUCOSE BLD STRIP.AUTO-MCNC: 179 MG/DL (ref 70–110)
GLUCOSE BLD STRIP.AUTO-MCNC: 191 MG/DL (ref 70–110)

## 2025-04-10 PROCEDURE — 3600000012 HC SURGERY LEVEL 2 ADDTL 15MIN: Performed by: UROLOGY

## 2025-04-10 PROCEDURE — 6360000002 HC RX W HCPCS: Performed by: REGISTERED NURSE

## 2025-04-10 PROCEDURE — 7100000000 HC PACU RECOVERY - FIRST 15 MIN: Performed by: UROLOGY

## 2025-04-10 PROCEDURE — 3600000002 HC SURGERY LEVEL 2 BASE: Performed by: UROLOGY

## 2025-04-10 PROCEDURE — 7100000011 HC PHASE II RECOVERY - ADDTL 15 MIN: Performed by: UROLOGY

## 2025-04-10 PROCEDURE — 2720000010 HC SURG SUPPLY STERILE: Performed by: UROLOGY

## 2025-04-10 PROCEDURE — 3700000001 HC ADD 15 MINUTES (ANESTHESIA): Performed by: UROLOGY

## 2025-04-10 PROCEDURE — 82962 GLUCOSE BLOOD TEST: CPT

## 2025-04-10 PROCEDURE — 2580000003 HC RX 258: Performed by: UROLOGY

## 2025-04-10 PROCEDURE — 2709999900 HC NON-CHARGEABLE SUPPLY: Performed by: UROLOGY

## 2025-04-10 PROCEDURE — 3700000000 HC ANESTHESIA ATTENDED CARE: Performed by: UROLOGY

## 2025-04-10 PROCEDURE — 7100000001 HC PACU RECOVERY - ADDTL 15 MIN: Performed by: UROLOGY

## 2025-04-10 PROCEDURE — 2500000003 HC RX 250 WO HCPCS: Performed by: REGISTERED NURSE

## 2025-04-10 PROCEDURE — 7100000010 HC PHASE II RECOVERY - FIRST 15 MIN: Performed by: UROLOGY

## 2025-04-10 RX ORDER — ONDANSETRON 2 MG/ML
4 INJECTION INTRAMUSCULAR; INTRAVENOUS
Status: DISCONTINUED | OUTPATIENT
Start: 2025-04-10 | End: 2025-04-10 | Stop reason: HOSPADM

## 2025-04-10 RX ORDER — ONDANSETRON 2 MG/ML
INJECTION INTRAMUSCULAR; INTRAVENOUS
Status: DISCONTINUED | OUTPATIENT
Start: 2025-04-10 | End: 2025-04-10 | Stop reason: SDUPTHER

## 2025-04-10 RX ORDER — OXYCODONE HYDROCHLORIDE 5 MG/1
5 TABLET ORAL
Status: DISCONTINUED | OUTPATIENT
Start: 2025-04-10 | End: 2025-04-10 | Stop reason: HOSPADM

## 2025-04-10 RX ORDER — MIDAZOLAM HYDROCHLORIDE 1 MG/ML
INJECTION, SOLUTION INTRAMUSCULAR; INTRAVENOUS
Status: DISCONTINUED | OUTPATIENT
Start: 2025-04-10 | End: 2025-04-10 | Stop reason: SDUPTHER

## 2025-04-10 RX ORDER — LIDOCAINE HYDROCHLORIDE 20 MG/ML
INJECTION, SOLUTION EPIDURAL; INFILTRATION; INTRACAUDAL; PERINEURAL
Status: DISCONTINUED | OUTPATIENT
Start: 2025-04-10 | End: 2025-04-10 | Stop reason: SDUPTHER

## 2025-04-10 RX ORDER — LABETALOL HYDROCHLORIDE 5 MG/ML
10 INJECTION, SOLUTION INTRAVENOUS
Status: DISCONTINUED | OUTPATIENT
Start: 2025-04-10 | End: 2025-04-10 | Stop reason: HOSPADM

## 2025-04-10 RX ORDER — NALOXONE HYDROCHLORIDE 0.4 MG/ML
INJECTION, SOLUTION INTRAMUSCULAR; INTRAVENOUS; SUBCUTANEOUS PRN
Status: DISCONTINUED | OUTPATIENT
Start: 2025-04-10 | End: 2025-04-10 | Stop reason: HOSPADM

## 2025-04-10 RX ORDER — SODIUM CHLORIDE 0.9 % (FLUSH) 0.9 %
5-40 SYRINGE (ML) INJECTION EVERY 12 HOURS SCHEDULED
Status: DISCONTINUED | OUTPATIENT
Start: 2025-04-10 | End: 2025-04-10 | Stop reason: HOSPADM

## 2025-04-10 RX ORDER — SODIUM CHLORIDE 9 MG/ML
INJECTION, SOLUTION INTRAVENOUS PRN
Status: DISCONTINUED | OUTPATIENT
Start: 2025-04-10 | End: 2025-04-10 | Stop reason: HOSPADM

## 2025-04-10 RX ORDER — SODIUM CHLORIDE 0.9 % (FLUSH) 0.9 %
5-40 SYRINGE (ML) INJECTION PRN
Status: DISCONTINUED | OUTPATIENT
Start: 2025-04-10 | End: 2025-04-10 | Stop reason: HOSPADM

## 2025-04-10 RX ORDER — LEVOFLOXACIN 5 MG/ML
500 INJECTION, SOLUTION INTRAVENOUS ONCE
Status: DISCONTINUED | OUTPATIENT
Start: 2025-04-10 | End: 2025-04-10 | Stop reason: HOSPADM

## 2025-04-10 RX ORDER — DEXMEDETOMIDINE HYDROCHLORIDE 100 UG/ML
INJECTION, SOLUTION INTRAVENOUS
Status: DISCONTINUED | OUTPATIENT
Start: 2025-04-10 | End: 2025-04-10 | Stop reason: SDUPTHER

## 2025-04-10 RX ORDER — DIPHENHYDRAMINE HYDROCHLORIDE 50 MG/ML
12.5 INJECTION, SOLUTION INTRAMUSCULAR; INTRAVENOUS
Status: DISCONTINUED | OUTPATIENT
Start: 2025-04-10 | End: 2025-04-10 | Stop reason: HOSPADM

## 2025-04-10 RX ORDER — PROPOFOL 10 MG/ML
INJECTION, EMULSION INTRAVENOUS
Status: DISCONTINUED | OUTPATIENT
Start: 2025-04-10 | End: 2025-04-10 | Stop reason: SDUPTHER

## 2025-04-10 RX ORDER — HYDROMORPHONE HYDROCHLORIDE 1 MG/ML
0.25 INJECTION, SOLUTION INTRAMUSCULAR; INTRAVENOUS; SUBCUTANEOUS EVERY 5 MIN PRN
Status: DISCONTINUED | OUTPATIENT
Start: 2025-04-10 | End: 2025-04-10 | Stop reason: HOSPADM

## 2025-04-10 RX ORDER — FENTANYL CITRATE 50 UG/ML
INJECTION, SOLUTION INTRAMUSCULAR; INTRAVENOUS
Status: DISCONTINUED | OUTPATIENT
Start: 2025-04-10 | End: 2025-04-10 | Stop reason: SDUPTHER

## 2025-04-10 RX ORDER — FENTANYL CITRATE 50 UG/ML
25 INJECTION, SOLUTION INTRAMUSCULAR; INTRAVENOUS EVERY 5 MIN PRN
Status: DISCONTINUED | OUTPATIENT
Start: 2025-04-10 | End: 2025-04-10 | Stop reason: HOSPADM

## 2025-04-10 RX ORDER — FUROSEMIDE 10 MG/ML
INJECTION INTRAMUSCULAR; INTRAVENOUS
Status: DISCONTINUED | OUTPATIENT
Start: 2025-04-10 | End: 2025-04-10 | Stop reason: SDUPTHER

## 2025-04-10 RX ORDER — SODIUM CHLORIDE, SODIUM LACTATE, POTASSIUM CHLORIDE, CALCIUM CHLORIDE 600; 310; 30; 20 MG/100ML; MG/100ML; MG/100ML; MG/100ML
INJECTION, SOLUTION INTRAVENOUS CONTINUOUS
Status: DISCONTINUED | OUTPATIENT
Start: 2025-04-10 | End: 2025-04-10 | Stop reason: HOSPADM

## 2025-04-10 RX ORDER — DROPERIDOL 2.5 MG/ML
0.62 INJECTION, SOLUTION INTRAMUSCULAR; INTRAVENOUS
Status: DISCONTINUED | OUTPATIENT
Start: 2025-04-10 | End: 2025-04-10 | Stop reason: HOSPADM

## 2025-04-10 RX ORDER — SODIUM CHLORIDE 9 MG/ML
INJECTION, SOLUTION INTRAVENOUS CONTINUOUS
Status: DISCONTINUED | OUTPATIENT
Start: 2025-04-10 | End: 2025-04-10 | Stop reason: HOSPADM

## 2025-04-10 RX ADMIN — MIDAZOLAM 2 MG: 1 INJECTION INTRAMUSCULAR; INTRAVENOUS at 12:56

## 2025-04-10 RX ADMIN — ONDANSETRON HYDROCHLORIDE 4 MG: 2 INJECTION INTRAMUSCULAR; INTRAVENOUS at 13:07

## 2025-04-10 RX ADMIN — LIDOCAINE HYDROCHLORIDE 40 MG: 20 INJECTION, SOLUTION EPIDURAL; INFILTRATION; INTRACAUDAL; PERINEURAL at 13:08

## 2025-04-10 RX ADMIN — FUROSEMIDE 10 MG: 10 INJECTION, SOLUTION INTRAMUSCULAR; INTRAVENOUS at 13:01

## 2025-04-10 RX ADMIN — PROPOFOL 100 MCG/KG/MIN: 10 INJECTION, EMULSION INTRAVENOUS at 12:58

## 2025-04-10 RX ADMIN — FENTANYL CITRATE 50 MCG: 50 INJECTION INTRAMUSCULAR; INTRAVENOUS at 12:56

## 2025-04-10 RX ADMIN — FENTANYL CITRATE 50 MCG: 50 INJECTION INTRAMUSCULAR; INTRAVENOUS at 13:04

## 2025-04-10 RX ADMIN — DEXMEDETOMIDINE HYDROCHLORIDE 4 MCG: 100 INJECTION, SOLUTION INTRAVENOUS at 13:06

## 2025-04-10 RX ADMIN — SODIUM CHLORIDE: 0.9 INJECTION, SOLUTION INTRAVENOUS at 10:25

## 2025-04-10 RX ADMIN — LIDOCAINE HYDROCHLORIDE 60 MG: 20 INJECTION, SOLUTION EPIDURAL; INFILTRATION; INTRACAUDAL; PERINEURAL at 12:59

## 2025-04-10 ASSESSMENT — PAIN SCALES - GENERAL
PAINLEVEL_OUTOF10: 0
PAINLEVEL_OUTOF10: 6
PAINLEVEL_OUTOF10: 0

## 2025-04-10 ASSESSMENT — PAIN DESCRIPTION - LOCATION: LOCATION: BACK

## 2025-04-10 NOTE — ANESTHESIA POSTPROCEDURE EVALUATION
Post-Anesthesia Evaluation and Assessment    Cardiovascular Function/Vital Signs/Pain Score:  Vitals  BP: (!) 165/74  Temp: 97.2 °F (36.2 °C)  Temp Source: Infrared  Pulse: 62  Respirations: 10  SpO2: 98 %  Height: 166.4 cm (5' 5.5\")  Weight - Scale: 94.1 kg (207 lb 8 oz)  Pain Level: 0    Patient is status post Procedure(s):  REZUM WATER VAPOR THERAPY OF THE PROSTATE \"SPEC POP\" PT HAS [employer] PACEMAKER- DUAL CHAMBER.    Nausea/Vomiting: Controlled.    Postoperative hydration reviewed and adequate.    Pain:  Managed    Mental Status and Level of Consciousness: Baseline and appropriate for discharge.    Adequate oxygenation and airway patent.    Complications related to anesthesia: None    Post-anesthesia assessment completed. No concerns.    Patient has met all discharge requirements.    Signed By: Benjamin Jacques MD    April 10, 2025

## 2025-04-10 NOTE — ANESTHESIA PRE PROCEDURE
CRI     (-) GERD       Endo/Other:    (+) DiabetesType II DM, using insulin.                 Abdominal:             Vascular: negative vascular ROS.         Other Findings:       Anesthesia Plan      MAC     ASA 3           MIPS: Postoperative opioids intended.  Anesthetic plan and risks discussed with patient.      Plan discussed with CRNA.    Attending anesthesiologist reviewed and agrees with Preprocedure content            Benjamin Jacques MD   4/10/2025

## 2025-04-10 NOTE — INTERVAL H&P NOTE
Update History & Physical    The patient's History and Physical of April 3, 2025 was reviewed with the patient and I examined the patient. There was no change. The surgical site was confirmed by the patient and me.     Plan: The risks, benefits, expected outcome, and alternative to the recommended procedure have been discussed with the patient. Patient understands and wants to proceed with the procedure.     Electronically signed by Anmol Rios MD on 4/10/2025 at 9:36 AM

## 2025-04-10 NOTE — PROGRESS NOTES
TRANSFER - IN REPORT:    Verbal report received from RN,CRNA on Andrea NELSON Valmeyer II  being received from OR for routine post-op      Report consisted of patient's Situation, Background, Assessment and   Recommendations(SBAR).     Information from the following report(s) Nurse Handoff Report was reviewed with the receiving nurse.    Opportunity for questions and clarification was provided.      Assessment completed upon patient's arrival to unit and care assumed.

## 2025-04-10 NOTE — PERIOP NOTE
Reviewed PTA medication list with patient/caregiver and patient/caregiver denies any additional medications.     Patient admits to having a responsible adult care for them at home for at least 24 hours after surgery.    Patient encouraged to use gown warming system and informed that using said warming gown to regulate body temperature prior to a procedure has been shown to help reduce the risks of blood clots and infection.    Patient's pharmacy of choice verified and documented in PTA medication section.    Dual skin assessment & fall risk band verification completed with Kristy GALINDO RN.

## 2025-04-10 NOTE — DISCHARGE INSTRUCTIONS
become completely full.  Do not lie down for longer than 2 hours while you are wearing the leg bag.  Caring for the Night Bag  Always keep the night bag below the level of your bladder.  To hang your night bag while you sleep, place a clean plastic bag inside of a wastebasket. Hang the night bag on the inside of the wastebasket.        Cleaning the Drainage Bags  Wash your hands thoroughly with soap and water.  Rinse the equipment with cool water. Do not use hot water because it can damage the plastic equipment.  Wash the equipment with a mild liquid detergent (e.g., Ivory®) and rinse with cool water.  To decrease odor, fill the bag half-way with a mixture of 1 part white vinegar and 3 parts water. Shake the bag and let it sit for 15 minutes.  Rinse the bag with cool water and hang it up to dry.    Preventing Infection  Keep the drainage bag below the level of your bladder and off the floor at all times.  Keep the catheter secured to your thigh to prevent it from moving.  Do not lie on or block the flow of urine in the tubing.  Shower daily to keep the catheter clean.  Clean your hands before and after touching the catheter or bag.  The spout of the drainage bag should never touch the side of the toilet or any emptying container.  Special Points  You may see some blood or urine around where the catheter enters your body, especially when walking or having a bowel movement. This is normal, as long as there is urine draining into the drainage bag.  Drink 1 to 2 glasses of liquids every 2 hours while you're awake.    Call your doctor immediately if:  Your catheter comes out; do not try to replace it yourself  You have a temperature of 101° F (38.3° C) or higher  You have a decrease in the amount of urine you are making  You have foul-smelling urine  You have bright red blood or large blood clots in your urine  You have abdominal pain and no urine in your catheter bag       Sedation: Care Instructions  How can you care  if:  You have trouble breathing.  You passed out (lost consciousness).  Call your doctor now or seek immediate medical care if:  You have nausea or vomiting that gets worse or won't stop.  You have a fever.  You have a new or worse headache.  The medicine is not wearing off and you can't think clearly.  Watch closely for changes in your health, and be sure to contact your doctor if:  You do not get better as expected.  Where can you learn more?  Go to https://www.YourListen.com.net/patientEd and enter G817 to learn more about \"Sedation: Care Instructions.\"  Current as of: July 31, 2024  Content Version: 14.4  © 2092-0778 [employer].   Care instructions adapted under license by [employer]. If you have questions about a medical condition or this instruction, always ask your healthcare professional. [employer] disclaims any warranty or liability for your use of this information.

## 2025-04-10 NOTE — PERIOP NOTE
No intake/output data recorded.  I/O this shift:  In: 800 [P.O.:150; I.V.:650]  Out: 300 [Urine:300]

## 2025-04-10 NOTE — PERIOP NOTE
TRANSFER - IN REPORT:    Verbal report received from JACKIE Del Rosario  on Andrea Ely II  being received from PACU  for routine progression of patient care      Report consisted of patient's Situation, Background, Assessment and   Recommendations(SBAR).     Information from the following report(s) Nurse Handoff Report, Surgery Report, Intake/Output, and MAR was reviewed with the receiving nurse.    Opportunity for questions and clarification was provided.      Assessment completed upon patient's arrival to unit and care assumed.

## 2025-04-10 NOTE — BRIEF OP NOTE
Brief Postoperative Note      Patient: Andrea Ely II  YOB: 1960  MRN: [id number]    Date of Procedure: 4/10/2025    Pre-Op Diagnosis Codes:      * Benign localized prostatic hyperplasia with lower urinary tract symptoms (LUTS) [N40.1]    Post-Op Diagnosis: Same       Procedure(s):  REZUM WATER VAPOR THERAPY OF THE PROSTATE \"SPEC POP\" PT HAS [employer] PACEMAKER- DUAL CHAMBER    Surgeon(s):  Anmol Rios MD    Assistant:  * No surgical staff found *    Anesthesia: Monitor Anesthesia Care    Estimated Blood Loss (mL): Minimal    Complications: None    Specimens:   * No specimens in log *    Implants:  * No implants in log *      Drains:   Urinary Catheter 04/10/25 (Active)       Findings:  Infection Present At Time Of Surgery (PATOS) (choose all levels that have infection present):  No infection present      DESCRIPTION OF PROCEDURE:  The pt was brought into the operating room, after the administration of general anesthesia, the patient was placed in lithotomy position.  Transurethral destruction of prostate tissue; by radiofrequency thermotherapy. The prostatic urethra length was measured  The anterior urethra, prostate, bladder neck, right and left orifices were visualized as the scope was passed.  RF was then used to create thermal energy to selectively ablate prostate tissue 1cm from the bladder neck to the proximal end of the veru spaced 1cm apart.  A total of 4 treatments were performed, 2 on each side.  At the completion of the procedure the treatment device was removed.  There was a careful check that there were no clots in the bladder or injury to the bladder, prostate or urethra.  10 mg of Lasix was given IV to promote diuresis.  A 22 Amharic goldstein was placed with clear efflux urine Pt was extubated and transferred to PACU in stable condition    Electronically signed by Anmol Rios MD on 4/10/2025 at 1:14 PM

## 2025-04-10 NOTE — PROGRESS NOTES
TRANSFER - OUT REPORT:    Verbal report given to JACKIE Woodson on Andrea Ely II  being transferred to PHASE[facility] for routine progression of patient care       Report consisted of patient's Situation, Background, Assessment and   Recommendations(SBAR).     Information from the following report(s) Nurse Handoff Report, Adult Overview, Surgery Report, Intake/Output, and MAR was reviewed with the receiving nurse.           Lines:   Peripheral IV 04/10/25 Proximal;Right;Ventral Forearm (Active)   Site Assessment Clean, dry & intact 04/10/25 1337   Line Status Infusing 04/10/25 1337   Line Care Connections checked and tightened 04/10/25 1337   Phlebitis Assessment No symptoms 04/10/25 1337   Infiltration Assessment 0 04/10/25 1337   Alcohol Cap Used No 04/10/25 1337   Dressing Status Clean, dry & intact 04/10/25 1337   Dressing Type Transparent 04/10/25 1337   Dressing Intervention New 04/10/25 1029        Opportunity for questions and clarification was provided.      Patient transported with:  Registered Nurse

## 2025-04-15 NOTE — OP NOTE
Operative Note      Patient: Andrea Ely II  YOB: 1960  MRN: [id number]    Date of Procedure: 4/10/2025    Pre-Op Diagnosis Codes:      * Benign localized prostatic hyperplasia with lower urinary tract symptoms (LUTS) [N40.1]    Post-Op Diagnosis: Same       Procedure(s):  REZUM WATER VAPOR THERAPY OF THE PROSTATE \"SPEC POP\" PT HAS [employer]IFIC PACEMAKER- DUAL CHAMBER    Surgeon(s):  Anmol Rios MD    Assistant:   * No surgical staff found *    Anesthesia: Monitor Anesthesia Care    Estimated Blood Loss (mL): Minimal    Complications: None    Specimens:   * No specimens in log *    Implants:  * No implants in log *      Drains:   [REMOVED] Urinary Catheter 04/10/25 (Removed)   Catheter Indications Perioperative use for selected surgical procedures;Urinary retention (acute or chronic), continuous bladder irrigation or bladder outlet obstruction 04/10/25 1453   Site Assessment Urethral drainage 04/10/25 1453   Urine Color Yellow 04/10/25 1453   Urine Appearance Clear 04/10/25 1453   Collection Container Leg bag 04/10/25 1453   Securement Method Leg strap 04/10/25 1453   Catheter Best Practices  Catheter secured to thigh;Tamper seal intact;Bag below bladder;Bag not on floor;Lack of dependent loop in tubing;Drainage bag less than half full 04/10/25 1453   Output (mL) 200 mL 04/10/25 1453       Findings:  Infection Present At Time Of Surgery (PATOS) (choose all levels that have infection present):  No infection present      Detailed Description of Procedure:     DESCRIPTION OF PROCEDURE:  The pt was brought into the operating room, after the administration of general anesthesia, the patient was placed in lithotomy position.  Transurethral destruction of prostate tissue; by radiofrequency thermotherapy. The prostatic urethra length was measured  The anterior urethra, prostate, bladder neck, right and left orifices were visualized as the scope was passed.  RF was then used to create thermal energy

## (undated) DEVICE — SOLUTION IRRIG 500ML 0.9% SOD CHLO USP POUR PLAS BTL

## (undated) DEVICE — SYRINGE IRRIG 60ML SFT PLIABLE BLB EZ TO GRP 1 HND USE W/

## (undated) DEVICE — CATHETER URETH 20FR BLLN 5CC STD LTX HYDRGEL 2 W F BARDX

## (undated) DEVICE — PACK PROCEDURE SURG LAMINECTOMY SPINE CUST

## (undated) DEVICE — SUTURE ABSORBABLE BRAIDED 1-0 OS-8 CR 3X18 IN UD VICRYL J757T

## (undated) DEVICE — GLOVE ORANGE PI 8   MSG9080

## (undated) DEVICE — KIT EVAC 0.13IN RECT TB DIA10FR 400CC PVC 3 SPR Y CONN DRN

## (undated) DEVICE — 3.0MM PRECISION NEURO (MATCH HEAD)

## (undated) DEVICE — SOLUTION IV 1000 ML 0.9 NACL INJ USP EXCEL PLAS CONTAINER

## (undated) DEVICE — PAD,ABDOMINAL,5"X9",ST,LF,25/BX: Brand: MEDLINE INDUSTRIES, INC.

## (undated) DEVICE — SNAPSECURE FOLEY DEVICE: Brand: MEDLINE

## (undated) DEVICE — DRESSING STERILE PETRO W3XL8IN N ADH OIL EMUL GZ CURAD

## (undated) DEVICE — CYSTO PACK: Brand: MEDLINE INDUSTRIES, INC.

## (undated) DEVICE — SOLUTION IV LACTATED RINGERS INJECTION USP

## (undated) DEVICE — GLOVE ORANGE PI 8 1/2   MSG9085

## (undated) DEVICE — SYRINGE MED 50ML LUERLOCK TIP

## (undated) DEVICE — CATHETER URETH 22FR BLLN 5CC SIL HYDRGEL 2 W F SPEC CARS M

## (undated) DEVICE — HANDPIECE SET WITH HIGH FLOW TIP AND SUCTION TUBE: Brand: INTERPULSE

## (undated) DEVICE — TOTAL TRAY, DB, 100% SILI FOLEY, 16FR 10: Brand: MEDLINE

## (undated) DEVICE — SUTURE VCRL + SZ 2-0 L18IN ABSRB VLT CT-2 1/2 CIR TAPERCUT VCP726D

## (undated) DEVICE — STRAP,POSITIONING,KNEE/BODY,FOAM,4X60": Brand: MEDLINE

## (undated) DEVICE — POUCH DRNGE FLX BND INTEGR RAIL CLMP DISP EZ CTCH

## (undated) DEVICE — SOLUTION IV 100ML 0.9% SOD CHL PLAS CONT USP VIAFLX 1 PER

## (undated) DEVICE — GLOVE SURG SZ 65 THK91MIL LTX FREE SYN POLYISOPRENE

## (undated) DEVICE — DRAINBAG,ANTI-REFLUX TOWER,L/F,2000ML,LL: Brand: MEDLINE

## (undated) DEVICE — GLOVE ORANGE PI 7   MSG9070

## (undated) DEVICE — GAUZE,SPONGE,8"X4",12PLY,XRAY,STRL,LF: Brand: MEDLINE

## (undated) DEVICE — YANKAUER,FLEXIBLE HANDLE,REGLR CAPACITY: Brand: MEDLINE INDUSTRIES, INC.

## (undated) DEVICE — 3M™ TEGADERM™ TRANSPARENT FILM DRESSING FRAME STYLE, 1627, 4 IN X 10 IN (10 CM X 25 CM), 20/CT 4CT/CASE: Brand: 3M™ TEGADERM™

## (undated) DEVICE — DEVICE DEL H2O VPR THER W/CABLE SYR SPIKE ADAPTOR VI REZUM

## (undated) DEVICE — ROUND DISSECTORS: Brand: DEROYAL

## (undated) DEVICE — Device

## (undated) DEVICE — STERILE LATEX POWDER-FREE SURGICAL GLOVESWITH NITRILE COATING: Brand: PROTEXIS